# Patient Record
Sex: MALE | Race: OTHER | HISPANIC OR LATINO | Employment: UNEMPLOYED | URBAN - METROPOLITAN AREA
[De-identification: names, ages, dates, MRNs, and addresses within clinical notes are randomized per-mention and may not be internally consistent; named-entity substitution may affect disease eponyms.]

---

## 2017-09-09 ENCOUNTER — HOSPITAL ENCOUNTER (EMERGENCY)
Facility: HOSPITAL | Age: 27
Discharge: HOME | End: 2017-09-09
Attending: EMERGENCY MEDICINE
Payer: COMMERCIAL

## 2017-09-09 VITALS
TEMPERATURE: 97.8 F | OXYGEN SATURATION: 98 % | HEART RATE: 83 BPM | WEIGHT: 181.44 LBS | DIASTOLIC BLOOD PRESSURE: 91 MMHG | SYSTOLIC BLOOD PRESSURE: 144 MMHG | RESPIRATION RATE: 16 BRPM

## 2017-09-09 DIAGNOSIS — H60.02 ABSCESS OF LEFT EXTERNAL EAR: Primary | ICD-10-CM

## 2017-09-09 PROCEDURE — 99282 EMERGENCY DEPT VISIT SF MDM: CPT

## 2017-09-09 RX ORDER — AMOXICILLIN AND CLAVULANATE POTASSIUM 875; 125 MG/1; MG/1
1 TABLET, FILM COATED ORAL 2 TIMES DAILY
Qty: 14 TABLET | Refills: 0 | Status: ON HOLD | OUTPATIENT
Start: 2017-09-09 | End: 2017-09-16

## 2017-09-09 RX ORDER — LIDOCAINE HYDROCHLORIDE 10 MG/ML
1 INJECTION, SOLUTION EPIDURAL; INFILTRATION; INTRACAUDAL; PERINEURAL ONCE
Status: COMPLETED | OUTPATIENT
Start: 2017-09-09 | End: 2017-09-09

## 2017-09-09 RX ORDER — AMOXICILLIN AND CLAVULANATE POTASSIUM 875; 125 MG/1; MG/1
1 TABLET, FILM COATED ORAL ONCE
Status: COMPLETED | OUTPATIENT
Start: 2017-09-09 | End: 2017-09-09

## 2017-09-09 RX ADMIN — AMOXICILLIN AND CLAVULANATE POTASSIUM 1 TABLET: 875; 125 TABLET, FILM COATED ORAL at 21:34

## 2017-09-09 RX ADMIN — LIDOCAINE HYDROCHLORIDE 1 ML: 10 INJECTION, SOLUTION EPIDURAL; INFILTRATION; INTRACAUDAL; PERINEURAL at 21:42

## 2017-09-11 ENCOUNTER — HOSPITAL ENCOUNTER (INPATIENT)
Facility: HOSPITAL | Age: 27
LOS: 4 days | Discharge: HOME | DRG: 115 | End: 2017-09-16
Attending: EMERGENCY MEDICINE | Admitting: INTERNAL MEDICINE
Payer: COMMERCIAL

## 2017-09-11 ENCOUNTER — APPOINTMENT (EMERGENCY)
Dept: CT IMAGING | Facility: HOSPITAL | Age: 27
DRG: 115 | End: 2017-09-11
Payer: COMMERCIAL

## 2017-09-11 DIAGNOSIS — H60.12 CELLULITIS OF LEFT EAR: Primary | ICD-10-CM

## 2017-09-11 DIAGNOSIS — H60.02 ABSCESS OF LEFT EXTERNAL EAR: ICD-10-CM

## 2017-09-11 LAB
ALBUMIN SERPL BCP-MCNC: 4.1 G/DL (ref 3.5–5)
ALP SERPL-CCNC: 82 U/L (ref 46–116)
ALT SERPL W P-5'-P-CCNC: 76 U/L (ref 12–78)
ANION GAP SERPL CALCULATED.3IONS-SCNC: 8 MMOL/L (ref 4–13)
APTT PPP: 29 SECONDS (ref 23–35)
AST SERPL W P-5'-P-CCNC: 37 U/L (ref 5–45)
BASOPHILS # BLD AUTO: 0.02 THOUSANDS/ΜL (ref 0–0.1)
BASOPHILS NFR BLD AUTO: 0 % (ref 0–1)
BILIRUB SERPL-MCNC: 0.4 MG/DL (ref 0.2–1)
BUN SERPL-MCNC: 17 MG/DL (ref 5–25)
CALCIUM SERPL-MCNC: 9.1 MG/DL (ref 8.3–10.1)
CHLORIDE SERPL-SCNC: 101 MMOL/L (ref 100–108)
CO2 SERPL-SCNC: 31 MMOL/L (ref 21–32)
CREAT SERPL-MCNC: 1.02 MG/DL (ref 0.6–1.3)
CRP SERPL HS-MCNC: 62.49 MG/L
EOSINOPHIL # BLD AUTO: 0.11 THOUSAND/ΜL (ref 0–0.61)
EOSINOPHIL NFR BLD AUTO: 1 % (ref 0–6)
ERYTHROCYTE [DISTWIDTH] IN BLOOD BY AUTOMATED COUNT: 12.8 % (ref 11.6–15.1)
GFR SERPL CREATININE-BSD FRML MDRD: 100 ML/MIN/1.73SQ M
GLUCOSE SERPL-MCNC: 101 MG/DL (ref 65–140)
HCT VFR BLD AUTO: 46.8 % (ref 36.5–49.3)
HGB BLD-MCNC: 15.9 G/DL (ref 12–17)
INR PPP: 0.98 (ref 0.86–1.16)
LACTATE SERPL-SCNC: 1.4 MMOL/L (ref 0.5–2)
LYMPHOCYTES # BLD AUTO: 1.78 THOUSANDS/ΜL (ref 0.6–4.47)
LYMPHOCYTES NFR BLD AUTO: 15 % (ref 14–44)
MCH RBC QN AUTO: 27.9 PG (ref 26.8–34.3)
MCHC RBC AUTO-ENTMCNC: 34 G/DL (ref 31.4–37.4)
MCV RBC AUTO: 82 FL (ref 82–98)
MONOCYTES # BLD AUTO: 1.03 THOUSAND/ΜL (ref 0.17–1.22)
MONOCYTES NFR BLD AUTO: 9 % (ref 4–12)
NEUTROPHILS # BLD AUTO: 8.92 THOUSANDS/ΜL (ref 1.85–7.62)
NEUTS SEG NFR BLD AUTO: 75 % (ref 43–75)
PLATELET # BLD AUTO: 256 THOUSANDS/UL (ref 149–390)
PMV BLD AUTO: 10.4 FL (ref 8.9–12.7)
POTASSIUM SERPL-SCNC: 3.8 MMOL/L (ref 3.5–5.3)
PROT SERPL-MCNC: 8.1 G/DL (ref 6.4–8.2)
PROTHROMBIN TIME: 13.3 SECONDS (ref 12.1–14.4)
RBC # BLD AUTO: 5.69 MILLION/UL (ref 3.88–5.62)
SODIUM SERPL-SCNC: 140 MMOL/L (ref 136–145)
WBC # BLD AUTO: 11.86 THOUSAND/UL (ref 4.31–10.16)

## 2017-09-11 PROCEDURE — 85652 RBC SED RATE AUTOMATED: CPT | Performed by: EMERGENCY MEDICINE

## 2017-09-11 PROCEDURE — 85610 PROTHROMBIN TIME: CPT | Performed by: EMERGENCY MEDICINE

## 2017-09-11 PROCEDURE — 80053 COMPREHEN METABOLIC PANEL: CPT | Performed by: EMERGENCY MEDICINE

## 2017-09-11 PROCEDURE — 86141 C-REACTIVE PROTEIN HS: CPT | Performed by: EMERGENCY MEDICINE

## 2017-09-11 PROCEDURE — 96375 TX/PRO/DX INJ NEW DRUG ADDON: CPT

## 2017-09-11 PROCEDURE — 70481 CT ORBIT/EAR/FOSSA W/DYE: CPT

## 2017-09-11 PROCEDURE — 96365 THER/PROPH/DIAG IV INF INIT: CPT

## 2017-09-11 PROCEDURE — 83605 ASSAY OF LACTIC ACID: CPT | Performed by: EMERGENCY MEDICINE

## 2017-09-11 PROCEDURE — 87040 BLOOD CULTURE FOR BACTERIA: CPT | Performed by: EMERGENCY MEDICINE

## 2017-09-11 PROCEDURE — 36415 COLL VENOUS BLD VENIPUNCTURE: CPT | Performed by: EMERGENCY MEDICINE

## 2017-09-11 PROCEDURE — 85025 COMPLETE CBC W/AUTO DIFF WBC: CPT | Performed by: EMERGENCY MEDICINE

## 2017-09-11 PROCEDURE — 96376 TX/PRO/DX INJ SAME DRUG ADON: CPT

## 2017-09-11 PROCEDURE — 85730 THROMBOPLASTIN TIME PARTIAL: CPT | Performed by: EMERGENCY MEDICINE

## 2017-09-11 PROCEDURE — 96367 TX/PROPH/DG ADDL SEQ IV INF: CPT

## 2017-09-11 RX ORDER — CLINDAMYCIN PHOSPHATE 600 MG/50ML
600 INJECTION INTRAVENOUS ONCE
Status: COMPLETED | OUTPATIENT
Start: 2017-09-11 | End: 2017-09-12

## 2017-09-11 RX ORDER — ONDANSETRON 2 MG/ML
4 INJECTION INTRAMUSCULAR; INTRAVENOUS ONCE
Status: COMPLETED | OUTPATIENT
Start: 2017-09-11 | End: 2017-09-11

## 2017-09-11 RX ADMIN — HYDROMORPHONE HYDROCHLORIDE 1 MG: 1 INJECTION, SOLUTION INTRAMUSCULAR; INTRAVENOUS; SUBCUTANEOUS at 23:46

## 2017-09-11 RX ADMIN — HYDROMORPHONE HYDROCHLORIDE 0.5 MG: 1 INJECTION, SOLUTION INTRAMUSCULAR; INTRAVENOUS; SUBCUTANEOUS at 22:48

## 2017-09-11 RX ADMIN — SODIUM CHLORIDE 1000 ML: 0.9 INJECTION, SOLUTION INTRAVENOUS at 22:49

## 2017-09-11 RX ADMIN — CLINDAMYCIN PHOSPHATE 600 MG: 12 INJECTION, SOLUTION INTRAMUSCULAR; INTRAVENOUS at 23:36

## 2017-09-11 RX ADMIN — CEFTRIAXONE SODIUM 1000 MG: 10 INJECTION, POWDER, FOR SOLUTION INTRAVENOUS at 23:03

## 2017-09-11 RX ADMIN — ONDANSETRON 4 MG: 2 INJECTION INTRAMUSCULAR; INTRAVENOUS at 22:48

## 2017-09-12 ENCOUNTER — APPOINTMENT (INPATIENT)
Dept: CT IMAGING | Facility: HOSPITAL | Age: 27
DRG: 115 | End: 2017-09-12
Payer: COMMERCIAL

## 2017-09-12 PROBLEM — H60.02 ABSCESS OF LEFT EXTERNAL EAR: Status: ACTIVE | Noted: 2017-09-12

## 2017-09-12 PROBLEM — H60.12 CELLULITIS OF LEFT EAR: Status: ACTIVE | Noted: 2017-09-12

## 2017-09-12 LAB — ERYTHROCYTE [SEDIMENTATION RATE] IN BLOOD: 6 MM/HOUR (ref 0–10)

## 2017-09-12 PROCEDURE — 99285 EMERGENCY DEPT VISIT HI MDM: CPT

## 2017-09-12 PROCEDURE — 09913ZZ DRAINAGE OF LEFT EXTERNAL EAR, PERCUTANEOUS APPROACH: ICD-10-PCS | Performed by: OTOLARYNGOLOGY

## 2017-09-12 PROCEDURE — 70470 CT HEAD/BRAIN W/O & W/DYE: CPT

## 2017-09-12 PROCEDURE — 87205 SMEAR GRAM STAIN: CPT | Performed by: OTOLARYNGOLOGY

## 2017-09-12 PROCEDURE — 87070 CULTURE OTHR SPECIMN AEROBIC: CPT | Performed by: OTOLARYNGOLOGY

## 2017-09-12 RX ORDER — METHYLPREDNISOLONE SODIUM SUCCINATE 40 MG/ML
40 INJECTION, POWDER, LYOPHILIZED, FOR SOLUTION INTRAMUSCULAR; INTRAVENOUS ONCE
Status: COMPLETED | OUTPATIENT
Start: 2017-09-12 | End: 2017-09-12

## 2017-09-12 RX ORDER — LORAZEPAM 0.5 MG/1
0.5 TABLET ORAL EVERY 6 HOURS PRN
Status: DISCONTINUED | OUTPATIENT
Start: 2017-09-12 | End: 2017-09-16 | Stop reason: HOSPADM

## 2017-09-12 RX ORDER — OXYCODONE HYDROCHLORIDE AND ACETAMINOPHEN 5; 325 MG/1; MG/1
1 TABLET ORAL EVERY 4 HOURS PRN
Status: DISCONTINUED | OUTPATIENT
Start: 2017-09-12 | End: 2017-09-16 | Stop reason: HOSPADM

## 2017-09-12 RX ORDER — ONDANSETRON 2 MG/ML
4 INJECTION INTRAMUSCULAR; INTRAVENOUS EVERY 6 HOURS PRN
Status: DISCONTINUED | OUTPATIENT
Start: 2017-09-12 | End: 2017-09-16 | Stop reason: HOSPADM

## 2017-09-12 RX ORDER — CLINDAMYCIN PHOSPHATE 600 MG/50ML
600 INJECTION INTRAVENOUS EVERY 8 HOURS
Status: DISCONTINUED | OUTPATIENT
Start: 2017-09-12 | End: 2017-09-12

## 2017-09-12 RX ORDER — ACETAMINOPHEN 325 MG/1
650 TABLET ORAL EVERY 6 HOURS PRN
Status: DISCONTINUED | OUTPATIENT
Start: 2017-09-12 | End: 2017-09-16 | Stop reason: HOSPADM

## 2017-09-12 RX ORDER — TRAMADOL HYDROCHLORIDE 50 MG/1
50 TABLET ORAL EVERY 6 HOURS PRN
Status: DISCONTINUED | OUTPATIENT
Start: 2017-09-12 | End: 2017-09-12 | Stop reason: ALTCHOICE

## 2017-09-12 RX ORDER — IBUPROFEN 400 MG/1
400 TABLET ORAL EVERY 4 HOURS PRN
Status: DISCONTINUED | OUTPATIENT
Start: 2017-09-12 | End: 2017-09-16 | Stop reason: HOSPADM

## 2017-09-12 RX ORDER — DIPHENHYDRAMINE HYDROCHLORIDE 50 MG/ML
25 INJECTION INTRAMUSCULAR; INTRAVENOUS EVERY 6 HOURS PRN
Status: DISCONTINUED | OUTPATIENT
Start: 2017-09-12 | End: 2017-09-16 | Stop reason: HOSPADM

## 2017-09-12 RX ADMIN — HYDROMORPHONE HYDROCHLORIDE 1 MG: 1 INJECTION, SOLUTION INTRAMUSCULAR; INTRAVENOUS; SUBCUTANEOUS at 02:46

## 2017-09-12 RX ADMIN — IOHEXOL 100 ML: 350 INJECTION, SOLUTION INTRAVENOUS at 22:51

## 2017-09-12 RX ADMIN — OXYCODONE HYDROCHLORIDE AND ACETAMINOPHEN 1 TABLET: 5; 325 TABLET ORAL at 07:59

## 2017-09-12 RX ADMIN — HYDROMORPHONE HYDROCHLORIDE 1 MG: 1 INJECTION, SOLUTION INTRAMUSCULAR; INTRAVENOUS; SUBCUTANEOUS at 09:52

## 2017-09-12 RX ADMIN — HYDROMORPHONE HYDROCHLORIDE 1 MG: 1 INJECTION, SOLUTION INTRAMUSCULAR; INTRAVENOUS; SUBCUTANEOUS at 06:27

## 2017-09-12 RX ADMIN — CEFAZOLIN SODIUM 1000 MG: 1 SOLUTION INTRAVENOUS at 06:37

## 2017-09-12 RX ADMIN — IBUPROFEN 400 MG: 400 TABLET ORAL at 10:33

## 2017-09-12 RX ADMIN — CEFAZOLIN SODIUM 1000 MG: 1 SOLUTION INTRAVENOUS at 13:31

## 2017-09-12 RX ADMIN — IOHEXOL 100 ML: 350 INJECTION, SOLUTION INTRAVENOUS at 00:45

## 2017-09-12 RX ADMIN — DIPHENHYDRAMINE HYDROCHLORIDE 25 MG: 50 INJECTION, SOLUTION INTRAMUSCULAR; INTRAVENOUS at 21:09

## 2017-09-12 RX ADMIN — METHYLPREDNISOLONE SODIUM SUCCINATE 40 MG: 40 INJECTION, POWDER, FOR SOLUTION INTRAMUSCULAR; INTRAVENOUS at 21:09

## 2017-09-12 RX ADMIN — CLINDAMYCIN PHOSPHATE 600 MG: 12 INJECTION, SOLUTION INTRAMUSCULAR; INTRAVENOUS at 19:18

## 2017-09-13 PROBLEM — Z72.0 TOBACCO ABUSE: Status: ACTIVE | Noted: 2017-09-13

## 2017-09-13 PROBLEM — L03.213 PERIORBITAL CELLULITIS OF LEFT EYE: Status: ACTIVE | Noted: 2017-09-13

## 2017-09-13 LAB
ANION GAP SERPL CALCULATED.3IONS-SCNC: 6 MMOL/L (ref 4–13)
BUN SERPL-MCNC: 15 MG/DL (ref 5–25)
CALCIUM SERPL-MCNC: 9.6 MG/DL (ref 8.3–10.1)
CHLORIDE SERPL-SCNC: 103 MMOL/L (ref 100–108)
CO2 SERPL-SCNC: 29 MMOL/L (ref 21–32)
CREAT SERPL-MCNC: 1.02 MG/DL (ref 0.6–1.3)
ERYTHROCYTE [DISTWIDTH] IN BLOOD BY AUTOMATED COUNT: 12.6 % (ref 11.6–15.1)
GFR SERPL CREATININE-BSD FRML MDRD: 100 ML/MIN/1.73SQ M
GLUCOSE SERPL-MCNC: 236 MG/DL (ref 65–140)
HCT VFR BLD AUTO: 44.7 % (ref 36.5–49.3)
HGB BLD-MCNC: 15.1 G/DL (ref 12–17)
MCH RBC QN AUTO: 27.9 PG (ref 26.8–34.3)
MCHC RBC AUTO-ENTMCNC: 33.8 G/DL (ref 31.4–37.4)
MCV RBC AUTO: 83 FL (ref 82–98)
PLATELET # BLD AUTO: 244 THOUSANDS/UL (ref 149–390)
PMV BLD AUTO: 10.5 FL (ref 8.9–12.7)
POTASSIUM SERPL-SCNC: 4.7 MMOL/L (ref 3.5–5.3)
RBC # BLD AUTO: 5.41 MILLION/UL (ref 3.88–5.62)
SODIUM SERPL-SCNC: 138 MMOL/L (ref 136–145)
WBC # BLD AUTO: 10.19 THOUSAND/UL (ref 4.31–10.16)

## 2017-09-13 PROCEDURE — 80048 BASIC METABOLIC PNL TOTAL CA: CPT | Performed by: PHYSICIAN ASSISTANT

## 2017-09-13 PROCEDURE — 85027 COMPLETE CBC AUTOMATED: CPT | Performed by: PHYSICIAN ASSISTANT

## 2017-09-13 RX ORDER — NICOTINE 21 MG/24HR
21 PATCH, TRANSDERMAL 24 HOURS TRANSDERMAL DAILY
Status: DISCONTINUED | OUTPATIENT
Start: 2017-09-13 | End: 2017-09-14

## 2017-09-13 RX ADMIN — IBUPROFEN 400 MG: 400 TABLET ORAL at 09:02

## 2017-09-13 RX ADMIN — VANCOMYCIN HYDROCHLORIDE 1250 MG: 1 INJECTION, POWDER, LYOPHILIZED, FOR SOLUTION INTRAVENOUS at 03:46

## 2017-09-13 RX ADMIN — VANCOMYCIN HYDROCHLORIDE 1250 MG: 1 INJECTION, POWDER, LYOPHILIZED, FOR SOLUTION INTRAVENOUS at 15:22

## 2017-09-13 RX ADMIN — NICOTINE 21 MG: 21 PATCH, EXTENDED RELEASE TRANSDERMAL at 13:35

## 2017-09-14 LAB — VANCOMYCIN TROUGH SERPL-MCNC: 6 UG/ML (ref 10–20)

## 2017-09-14 PROCEDURE — 80202 ASSAY OF VANCOMYCIN: CPT | Performed by: NURSE PRACTITIONER

## 2017-09-14 RX ADMIN — NICOTINE POLACRILEX 2 MG: 2 GUM, CHEWING ORAL at 12:56

## 2017-09-14 RX ADMIN — IBUPROFEN 400 MG: 400 TABLET ORAL at 17:34

## 2017-09-14 RX ADMIN — VANCOMYCIN HYDROCHLORIDE 1250 MG: 1 INJECTION, POWDER, LYOPHILIZED, FOR SOLUTION INTRAVENOUS at 17:29

## 2017-09-14 RX ADMIN — IBUPROFEN 400 MG: 400 TABLET ORAL at 12:14

## 2017-09-14 RX ADMIN — VANCOMYCIN HYDROCHLORIDE 1250 MG: 1 INJECTION, POWDER, LYOPHILIZED, FOR SOLUTION INTRAVENOUS at 03:12

## 2017-09-14 RX ADMIN — OXYCODONE HYDROCHLORIDE AND ACETAMINOPHEN 1 TABLET: 5; 325 TABLET ORAL at 10:54

## 2017-09-14 RX ADMIN — OXYCODONE HYDROCHLORIDE AND ACETAMINOPHEN 1 TABLET: 5; 325 TABLET ORAL at 05:59

## 2017-09-15 LAB
BACTERIA WND AEROBE CULT: NO GROWTH
GRAM STN SPEC: NORMAL
GRAM STN SPEC: NORMAL

## 2017-09-15 RX ADMIN — VANCOMYCIN HYDROCHLORIDE 1250 MG: 1 INJECTION, POWDER, LYOPHILIZED, FOR SOLUTION INTRAVENOUS at 02:06

## 2017-09-15 RX ADMIN — IBUPROFEN 400 MG: 400 TABLET ORAL at 08:36

## 2017-09-15 RX ADMIN — SODIUM CHLORIDE 3 G: 9 INJECTION, SOLUTION INTRAVENOUS at 21:41

## 2017-09-15 RX ADMIN — VANCOMYCIN HYDROCHLORIDE 1250 MG: 1 INJECTION, POWDER, LYOPHILIZED, FOR SOLUTION INTRAVENOUS at 10:05

## 2017-09-15 RX ADMIN — SODIUM CHLORIDE 3 G: 9 INJECTION, SOLUTION INTRAVENOUS at 16:50

## 2017-09-16 VITALS
WEIGHT: 179.5 LBS | OXYGEN SATURATION: 98 % | TEMPERATURE: 98.2 F | DIASTOLIC BLOOD PRESSURE: 72 MMHG | BODY MASS INDEX: 27.2 KG/M2 | RESPIRATION RATE: 18 BRPM | SYSTOLIC BLOOD PRESSURE: 128 MMHG | HEART RATE: 80 BPM | HEIGHT: 68 IN

## 2017-09-16 RX ORDER — AMOXICILLIN AND CLAVULANATE POTASSIUM 875; 125 MG/1; MG/1
1 TABLET, FILM COATED ORAL 2 TIMES DAILY
Qty: 8 TABLET | Refills: 0 | Status: SHIPPED | OUTPATIENT
Start: 2017-09-16 | End: 2017-09-20

## 2017-09-16 RX ADMIN — SODIUM CHLORIDE 3 G: 9 INJECTION, SOLUTION INTRAVENOUS at 03:25

## 2017-09-16 RX ADMIN — SODIUM CHLORIDE 3 G: 9 INJECTION, SOLUTION INTRAVENOUS at 08:19

## 2017-09-17 LAB — BACTERIA BLD CULT: NORMAL

## 2017-09-18 LAB
BACTERIA BLD CULT: NORMAL
GRAM STN SPEC: NORMAL

## 2017-09-20 ENCOUNTER — ALLSCRIPTS OFFICE VISIT (OUTPATIENT)
Dept: OTHER | Facility: OTHER | Age: 27
End: 2017-09-20

## 2018-01-10 NOTE — PROGRESS NOTES
Assessment    1  Left elbow pain (719 42) (M25 522)    Plan  Left elbow pain    · *1 - [facility] Physical Therapy  Consult  Status: Hold For - Scheduling   Requested for: 21Jan2016  Care Summary provided  : Yes    Discussion/Summary    Pt  encouraged to continue to follow-up with ortho  Pt  referred to PT for follow-up  Pt  instructed to follow-up prn  The treatment plan was reviewed with the patient/guardian  The patient/guardian understands and agrees with the treatment plan      Chief Complaint  Pt  is here for a follow-up for elbow surgery  History of Present Illness  Pt  is here for a follow-up for left elbow surgery  Pt  reports that he had the surgery about 3 weeks ago  Pt  reports that he is still having left elbow pain and it is stiff  Pt  reports that he is following up with ortho and needs a referral for PT  Pt  reports that he takes Percocet prn for the pain but it only helps alittle  Pt  reports that he doesn't like to take pain medication and would like to start PT to help with his symptoms  Review of Systems    Constitutional: no fever and no chills  Cardiovascular: no chest pain and no palpitations  Respiratory: no shortness of breath and no wheezing  Gastrointestinal: no abdominal pain, no nausea, no vomiting and no diarrhea  Musculoskeletal: as noted in HPI  Integumentary: no rashes  Neurological: no headache, no dizziness and no fainting  Active Problems    1  Aftercare following surgery of the musculoskeletal system (V58 78) (Z47 89)   2  Exposure to potentially hazardous body fluids (V15 85) (Z77 21)   3  Insomnia (780 52) (G47 00)   4  Left knee pain (719 46) (M25 562)   5  Other closed displaced fracture of distal end of left humerus, initial encounter (812 49)   (S42 492A)   6  Rupture of left anterior cruciate ligament (844 2) (S83 512A)   7  Sprain of medial collateral ligament of left knee (844 1) (S83 412A)   8   Urethritis (597 80) (N34 2)    Past Medical History    The active problems and past medical history were reviewed and updated today  Surgical History    1  History of Elbow Surgery    The surgical history was reviewed and updated today  Family History    1  No pertinent family history    The family history was reviewed and updated today  Social History    · Alcohol Use (History)   · Caffeine Use   · Current Every Day Smoker (305 1)   · Marital History - Single   · Sexually active with lack of responsibility  The social history was reviewed and updated today  Current Meds   1  Oxycodone-Acetaminophen 5-325 MG Oral Tablet; TAKE 1 TABLET every 4 hours as   needed for pain; Therapy: [id number] to (Last Rx:[id number]) Ordered    The medication list was reviewed and updated today  Allergies    1  No Known Drug Allergies    Vitals  Vital Signs [Data Includes: Current Encounter]    Recorded: 21Jan2016 09:17AM   Heart Rate 72   Respiration 14   Systolic 765   Diastolic 80   Height 5 ft 8 in   Weight 150 lb    BMI Calculated 22 81   BSA Calculated 1 81     Physical Exam    Constitutional   General appearance: No acute distress, well appearing and well nourished  Eyes   Conjunctiva and lids: No swelling, erythema, or discharge  Ears, Nose, Mouth, and Throat   External inspection of ears and nose: Normal     Pulmonary   Respiratory effort: No increased work of breathing or signs of respiratory distress  Auscultation of lungs: Clear to auscultation, equal breath sounds bilaterally, no wheezes, no rales, no rhonci  Cardiovascular   Auscultation of heart: Normal rate and rhythm, normal S1 and S2, without murmurs  radial pulses +2 bilaterally  Musculoskeletal   Gait and station: Normal     Inspection/palpation of joints, bones, and muscles: Abnormal   Left elbow is slightly swollen  No redness or warmth noted  Incision site wnl, steri- strips noted  Skin No rashes noted     Psychiatric   Orientation to person, place and time: Normal     Mood and affect: Normal          Attending Note  Collaborating Note: I agree with the Advanced Practitioner note  Future Appointments    Date/Time Provider Specialty Site   02/24/2016 11:20 AM GRECIA Moeller   Orthopedic Surgery [facility] [place]     Signatures   Electronically signed by : Lynnette Tucker; Jan 21 2016  2:20PM EST                       (Author)    Electronically signed by : Bruce Worley DO; Jan 22 2016  2:08PM EST                       (Author)

## 2018-01-12 NOTE — MISCELLANEOUS
Chief Complaint  Chief Complaint Free Text Note Form: patient was scheduled for 2 separate dates and did not show for either appointment  History of Present Illness  TCM Communication [facility]: The patient is being contacted for follow-up after hospitalization  Hospital records were reviewed  He was hospitalized at [facility]  The date of discharge: 9/16/2017  Diagnosis: EAR INFECTION  He was discharged to home  Medications reviewed and updated today  He scheduled a follow up appointment  The patient is currently asymptomatic  Counseling was provided to the patient  Communication performed and completed by [facility], LPN      Active Problems    1  Aftercare following surgery of the musculoskeletal system (V58 78) (Z47 89)   2  Contact dermatitis due to chemicals (692 4) (L25 3)   3  Contracture, elbow, left (718 42) (M24 522)   4  Exposure to potentially hazardous body fluids (V15 85) (Z77 21)   5  Insomnia (780 52) (G47 00)   6  Left elbow pain (719 42) (M25 522)   7  Left knee pain (719 46) (M25 562)   8  Other closed displaced fracture of distal end of left humerus, initial encounter (812 49)   (S42 492A)   9  Rupture of left anterior cruciate ligament (844 2) (S83 512A)   10  Sprain of medial collateral ligament of left knee (844 1) (Z30 420J)    Past Medical History    1  History of urethritis (V13 09) (Z87 448)   2  History of Penile ulcer (607 89) (N48 5)    Surgical History    1  History of Elbow Surgery    Family History  Mother    1  No pertinent family history  Unknown    2  No pertinent family history    Social History    · Alcohol Use (History)   · Caffeine Use   · Current Every Day Smoker (305 1)   · Marital History - Single   · Sexually active with lack of responsibility    Allergies    1  No Known Drug Allergies    Signatures   Electronically signed by :  Haven Zavala, ; Sep 18 2017  1:11PM EST                       (Author)    Electronically signed by : Sandra Dobbs OM; Oct  4 2017 10: 11AM EST                       (Author)    Electronically signed by : Yomi Alonzo DO; Oct  4 2017 11:01AM EST                       (Author)

## 2019-10-04 ENCOUNTER — OFFICE VISIT (OUTPATIENT)
Dept: FAMILY MEDICINE CLINIC | Facility: CLINIC | Age: 29
End: 2019-10-04
Payer: COMMERCIAL

## 2019-10-04 ENCOUNTER — TELEPHONE (OUTPATIENT)
Dept: FAMILY MEDICINE CLINIC | Facility: CLINIC | Age: 29
End: 2019-10-04

## 2019-10-04 ENCOUNTER — DOCUMENTATION (OUTPATIENT)
Dept: BEHAVIORAL/MENTAL HEALTH CLINIC | Facility: CLINIC | Age: 29
End: 2019-10-04

## 2019-10-04 ENCOUNTER — TELEPHONE (OUTPATIENT)
Dept: BEHAVIORAL/MENTAL HEALTH CLINIC | Facility: CLINIC | Age: 29
End: 2019-10-04

## 2019-10-04 VITALS
SYSTOLIC BLOOD PRESSURE: 130 MMHG | RESPIRATION RATE: 16 BRPM | BODY MASS INDEX: 25.62 KG/M2 | WEIGHT: 173 LBS | HEART RATE: 85 BPM | OXYGEN SATURATION: 98 % | DIASTOLIC BLOOD PRESSURE: 80 MMHG | HEIGHT: 69 IN

## 2019-10-04 DIAGNOSIS — Z72.0 TOBACCO ABUSE: ICD-10-CM

## 2019-10-04 DIAGNOSIS — Z28.21 INFLUENZA VACCINATION DECLINED BY PATIENT: ICD-10-CM

## 2019-10-04 DIAGNOSIS — Z76.89 ENCOUNTER TO ESTABLISH CARE: Primary | ICD-10-CM

## 2019-10-04 DIAGNOSIS — F41.9 ANXIETY: ICD-10-CM

## 2019-10-04 PROCEDURE — 99204 OFFICE O/P NEW MOD 45 MIN: CPT | Performed by: FAMILY MEDICINE

## 2019-10-04 PROCEDURE — 3008F BODY MASS INDEX DOCD: CPT | Performed by: FAMILY MEDICINE

## 2019-10-04 RX ORDER — ESCITALOPRAM OXALATE 10 MG/1
10 TABLET ORAL DAILY
Qty: 30 TABLET | Refills: 0 | Status: SHIPPED | OUTPATIENT
Start: 2019-10-04 | End: 2020-02-14

## 2019-10-04 NOTE — TELEPHONE ENCOUNTER
Tejas Echavarria was seen as new patient with Dr Farideh Clifford  She is referring him to see Matteokim Julien  He can not do Mondays  Can you call him and set up an appt on a different day

## 2019-10-04 NOTE — PROGRESS NOTES
Assessment/Plan:   Diagnoses and all orders for this visit:    Encounter to establish care  - reviewed PMHx, PSHx, meds, allergies, FHx and Soc Hx   - declined Flu vaccine in the office today     Anxiety  -     escitalopram (LEXAPRO) 10 mg tablet; Take 1 tablet (10 mg total) by mouth daily  -     Ambulatory referral to [facility]; Future  -     TSH, 3rd generation with Free T4 reflex; Future  -     Vitamin D 25 hydroxy; Future  - has been ongoing for the past 15yrs, has gotten worse in the past 5months   - PHQ-2 score of 2  - denies SI/HI   - hesitant to talk to a therapist but will take referral   - hesitant to try pharmacologics but will do a trial of Lexapro for 1month   - spent >30mins counseling pt  - RTO in 1month for f/u - pt aware and agreeable     Tobacco abuse  - former smoker, current QD vaping   - not interested in quitting at this time - will revisit at next OV in 1month - pt aware   Influenza vaccination declined by patient    Other orders  -     Cancel: TDAP VACCINE GREATER THAN OR EQUAL TO 6YO IM  -     Cancel: influenza vaccine, [id number], quadrivalent, 0 5 mL, preservative-free, for adult and pediatric patients 6 mos+ (AFLURIA, FLUARIX, FLULAVAL, FLUZONE)          Subjective:    Patient ID: Fabian Kim is a 34 y o  male    Fabian Kim is a 34 y o  male who presents to the office to establish care and for eval of anxiety  1) Establish Care   - prior PCP: hasn't been to a PCP in >3yrs   - PMHx: none   - allergies: NKDA  - Meds: none   - PSHx: fracture surgery   - FHx: F (Depression)   - Immunizations: declined Flu vaccine in the office today   - social: former smoker (quit 2018 - 1 5PPD/15yrs), (+) current vaping (3mg of Nicotine), occasional EtOH, denies illicits   2) Anxiety   - "mind is constantly racing", "its exhausting"   - the past 5months have been "really bad" and its turned into Depression  - "everything is going good" with work/family/friends   - has been going on for a while (~15yrs) - had been treated for insomnia before   - used to take Xanax for anxiety, but then would not be able to remember things/fall asleep and self d/c'd   - has been trying to deal with it by himself - but getting to a point where "its too much"   - has been on [employer] wondering if CBD can help   - (+) FHx of Depression in Father - currently not taking any medications   - PHQ-2 score of 2, denies SI/HI  - "it comes and goes"       The following portions of the patient's history were reviewed and updated as appropriate: allergies, current medications, past family history, past medical history, past social history, past surgical history and problem list     Review of Systems  as per HPI    Objective:  /80   Pulse 85   Resp 16   Ht 5' 8 5" (1 74 m)   Wt 78 5 kg (173 lb)   SpO2 98%   BMI 25 92 kg/m²    Physical Exam   Constitutional: He is oriented to person, place, and time  He appears well-developed and well-nourished  No distress  HENT:   Head: Normocephalic and atraumatic  Right Ear: External ear normal    Left Ear: External ear normal    Nose: Nose normal    Eyes: Conjunctivae and EOM are normal  Right eye exhibits no discharge  Left eye exhibits no discharge  No scleral icterus  Neck: Normal range of motion  Musculoskeletal: Normal range of motion  Neurological: He is alert and oriented to person, place, and time  Skin: Skin is warm  He is not diaphoretic  Psychiatric: His speech is normal and behavior is normal  Judgment and thought content normal  His mood appears anxious  Cognition and memory are normal  He expresses no homicidal and no suicidal ideation  He expresses no suicidal plans and no homicidal plans  PHQ-2 score of 2   Vitals reviewed  BMI Counseling: Body mass index is 25 92 kg/m²  The BMI is above normal  Nutrition recommendations include decreasing overall calorie intake  Exercise recommendations include joining a gym

## 2019-10-04 NOTE — TELEPHONE ENCOUNTER
1159-phone call to C[facility] re: scheduling an appointment  Call went to  but mailbox is full, so could not leave message

## 2019-10-04 NOTE — PROGRESS NOTES
providers      Caio Bob    Actions  To:  [email or web address]     Friday, October 04, 2019 12:06 PM        Hello, this message is for Rogers   I am the therapist at [address] [place] [address]   I was trying to contact you regarding setting up an appointment at one of my other offices, as Dr Marcelino Bryan has recommended you see me  However, my other practices are quite far from [place], and I am currently booking in November and [place] I wanted to offer you some closer referrals so you can call and try to get in with someone who might be able to see you sooner   Below are a few that are listed with your insurance company within [place]:  [address], [address] [facility], [address], [phone]     I hope that one of these is a good fit for you, and that they can get you in soon   If you need anything further from me, please call the [facility] office   Thank you!     Felipe Mccarthy, [email or web address]

## 2019-11-08 ENCOUNTER — APPOINTMENT (OUTPATIENT)
Dept: LAB | Facility: CLINIC | Age: 29
End: 2019-11-08
Payer: COMMERCIAL

## 2019-11-08 DIAGNOSIS — F41.9 ANXIETY: ICD-10-CM

## 2019-11-08 LAB
25(OH)D3 SERPL-MCNC: 15.2 NG/ML (ref 30–100)
TSH SERPL DL<=0.05 MIU/L-ACNC: 0.88 UIU/ML (ref 0.36–3.74)

## 2019-11-08 PROCEDURE — 84443 ASSAY THYROID STIM HORMONE: CPT

## 2019-11-08 PROCEDURE — 82306 VITAMIN D 25 HYDROXY: CPT

## 2019-11-08 PROCEDURE — 36415 COLL VENOUS BLD VENIPUNCTURE: CPT

## 2019-11-15 ENCOUNTER — OFFICE VISIT (OUTPATIENT)
Dept: FAMILY MEDICINE CLINIC | Facility: CLINIC | Age: 29
End: 2019-11-15
Payer: COMMERCIAL

## 2019-11-15 VITALS
DIASTOLIC BLOOD PRESSURE: 78 MMHG | OXYGEN SATURATION: 98 % | HEIGHT: 69 IN | BODY MASS INDEX: 25.62 KG/M2 | RESPIRATION RATE: 16 BRPM | HEART RATE: 69 BPM | WEIGHT: 173 LBS | SYSTOLIC BLOOD PRESSURE: 146 MMHG

## 2019-11-15 DIAGNOSIS — F41.9 ANXIETY: ICD-10-CM

## 2019-11-15 DIAGNOSIS — E55.9 VITAMIN D DEFICIENCY: ICD-10-CM

## 2019-11-15 DIAGNOSIS — Z28.21 INFLUENZA VACCINATION DECLINED BY PATIENT: ICD-10-CM

## 2019-11-15 DIAGNOSIS — F41.9 ANXIETY: Primary | ICD-10-CM

## 2019-11-15 PROCEDURE — 1036F TOBACCO NON-USER: CPT | Performed by: FAMILY MEDICINE

## 2019-11-15 PROCEDURE — 99214 OFFICE O/P EST MOD 30 MIN: CPT | Performed by: FAMILY MEDICINE

## 2019-11-15 RX ORDER — ESCITALOPRAM OXALATE 10 MG/1
TABLET ORAL
Qty: 30 TABLET | Refills: 0 | OUTPATIENT
Start: 2019-11-15

## 2019-11-15 NOTE — PROGRESS NOTES
Assessment/Plan:   Diagnoses and all orders for this visit:    Anxiety  -     Ambulatory referral to [facility]; Future  - nml TSH but with Vit D deficiency (see below)  - has been ongoing for the past 15yrs, has gotten worse in the past 6months   - PHQ-2 score of 2 at last OV on 10/4/2019   - denies SI/HI   - has referral to therapist but has been unable to schedule an appt with Felipe Mccarthy, referred to [facility]   - did a trial of Lexapro x2wks duration and noted that it did help, but unable to tolerate the side effects - did offer trial of Wellbutrin but pt declined   - spent 20mins counseling pt  - RTO in 3month for f/u - pt aware and agreeable     Vitamin D deficiency  -     Cholecalciferol 1 25 MG (01250 UT) capsule; Take 1 capsule (50,000 Units total) by mouth once a week x12wks and then to take Vit D 2000IU QD    Influenza vaccination declined by patient    Other orders  -     Cancel: influenza vaccine, [id number], quadrivalent, 0 5 mL, preservative-free, for adult and pediatric patients 6 mos+ (AFLURIA, FLUARIX, FLULAVAL, FLUZONE)        Subjective:    Patient ID: Latrice Lucero is a 34 y o  male    34yo M presents to the office for f/u of anxiety   - nml TSH and Vit D 15 2  - did try Lexapro 10mg QD x2wks duration but "felt sick to my stomach" and self d/c'd  - has not yet been able to schedule with Pato Vickers/Therapist   - (+) "mind is constantly racing", "its exhausting" - worries about his kids (10yo son and daughter), doesn't want them to have the same upbringing as he did, not in a relationship with their respective mothers   - "everything is going good" with work/family/friends   - has been going on for a while (~15yrs) - had been treated for insomnia before   - used to take Xanax for anxiety, but then would not be able to remember things/fall asleep and self d/c'd   - has been trying to deal with it by himself - but getting to a point where "its too much"   - (+) FHx of Depression in Father - currently not taking any medications   - PHQ-2 score of 2 (at last OV 5wks prior), denies SI/HI   - "it comes and goes"     The following portions of the patient's history were reviewed and updated as appropriate: allergies, current medications, past family history, past medical history, past social history, past surgical history and problem list     Review of Systems  as per HPI    Objective:  /78   Pulse 69   Resp 16   Ht 5' 8 5" (1 74 m)   Wt 78 5 kg (173 lb)   SpO2 98%   BMI 25 92 kg/m²    Physical Exam   Constitutional: He is oriented to person, place, and time  He appears well-developed and well-nourished  No distress  HENT:   Head: Normocephalic and atraumatic  Right Ear: External ear normal    Left Ear: External ear normal    Nose: Nose normal    Eyes: Conjunctivae and EOM are normal  Right eye exhibits no discharge  Left eye exhibits no discharge  No scleral icterus  Neck: Normal range of motion  Neck supple  Musculoskeletal: Normal range of motion  Neurological: He is alert and oriented to person, place, and time  Skin: He is not diaphoretic  Psychiatric: He has a normal mood and affect  His speech is normal and behavior is normal  Judgment and thought content normal  Cognition and memory are normal  He expresses no homicidal and no suicidal ideation  He expresses no suicidal plans and no homicidal plans  Vitals reviewed  I have spent 20 minutes with Patient  today in which greater than 50% of this time was spent in counseling/coordination of care regarding Prognosis, Risks and benefits of tx options, Intructions for management and Patient and family education  Tobacco Cessation Counseling: Tobacco cessation counseling was provided  The patient is sincerely urged to quit consumption of tobacco  He is not ready to quit tobacco  Medication options and side effects of medication discussed  Patient refused medication

## 2020-02-14 ENCOUNTER — OFFICE VISIT (OUTPATIENT)
Dept: FAMILY MEDICINE CLINIC | Facility: CLINIC | Age: 30
End: 2020-02-14
Payer: COMMERCIAL

## 2020-02-14 ENCOUNTER — APPOINTMENT (OUTPATIENT)
Dept: LAB | Facility: CLINIC | Age: 30
End: 2020-02-14
Payer: COMMERCIAL

## 2020-02-14 VITALS
OXYGEN SATURATION: 98 % | RESPIRATION RATE: 16 BRPM | WEIGHT: 174 LBS | BODY MASS INDEX: 25.77 KG/M2 | HEART RATE: 73 BPM | HEIGHT: 69 IN | SYSTOLIC BLOOD PRESSURE: 130 MMHG | DIASTOLIC BLOOD PRESSURE: 80 MMHG

## 2020-02-14 DIAGNOSIS — Z28.21 INFLUENZA VACCINATION DECLINED BY PATIENT: ICD-10-CM

## 2020-02-14 DIAGNOSIS — R00.2 INTERMITTENT PALPITATIONS: ICD-10-CM

## 2020-02-14 DIAGNOSIS — Z11.3 SCREENING EXAMINATION FOR STD (SEXUALLY TRANSMITTED DISEASE): ICD-10-CM

## 2020-02-14 DIAGNOSIS — Z13.0 SCREENING FOR DEFICIENCY ANEMIA: ICD-10-CM

## 2020-02-14 DIAGNOSIS — Z13.1 SCREENING FOR DIABETES MELLITUS: ICD-10-CM

## 2020-02-14 DIAGNOSIS — E55.9 VITAMIN D DEFICIENCY: ICD-10-CM

## 2020-02-14 DIAGNOSIS — Z72.0 TOBACCO ABUSE: ICD-10-CM

## 2020-02-14 DIAGNOSIS — F41.9 ANXIETY: ICD-10-CM

## 2020-02-14 DIAGNOSIS — Z00.00 ANNUAL PHYSICAL EXAM: Primary | ICD-10-CM

## 2020-02-14 LAB
ALBUMIN SERPL BCP-MCNC: 4.4 G/DL (ref 3.5–5)
ALP SERPL-CCNC: 48 U/L (ref 46–116)
ALT SERPL W P-5'-P-CCNC: 27 U/L (ref 12–78)
ANION GAP SERPL CALCULATED.3IONS-SCNC: 2 MMOL/L (ref 4–13)
AST SERPL W P-5'-P-CCNC: 13 U/L (ref 5–45)
BASOPHILS # BLD AUTO: 0.03 THOUSANDS/ΜL (ref 0–0.1)
BASOPHILS NFR BLD AUTO: 1 % (ref 0–1)
BILIRUB SERPL-MCNC: 0.86 MG/DL (ref 0.2–1)
BUN SERPL-MCNC: 19 MG/DL (ref 5–25)
CALCIUM SERPL-MCNC: 9.1 MG/DL (ref 8.3–10.1)
CHLORIDE SERPL-SCNC: 105 MMOL/L (ref 100–108)
CO2 SERPL-SCNC: 30 MMOL/L (ref 21–32)
CREAT SERPL-MCNC: 1.02 MG/DL (ref 0.6–1.3)
EOSINOPHIL # BLD AUTO: 0.04 THOUSAND/ΜL (ref 0–0.61)
EOSINOPHIL NFR BLD AUTO: 1 % (ref 0–6)
ERYTHROCYTE [DISTWIDTH] IN BLOOD BY AUTOMATED COUNT: 11.9 % (ref 11.6–15.1)
GFR SERPL CREATININE-BSD FRML MDRD: 99 ML/MIN/1.73SQ M
GLUCOSE SERPL-MCNC: 102 MG/DL (ref 65–140)
HAV IGM SER QL: NORMAL
HBV CORE IGM SER QL: NORMAL
HBV SURFACE AG SER QL: NORMAL
HCT VFR BLD AUTO: 49 % (ref 36.5–49.3)
HCV AB SER QL: NORMAL
HGB BLD-MCNC: 16.4 G/DL (ref 12–17)
IMM GRANULOCYTES # BLD AUTO: 0.01 THOUSAND/UL (ref 0–0.2)
IMM GRANULOCYTES NFR BLD AUTO: 0 % (ref 0–2)
LYMPHOCYTES # BLD AUTO: 2.25 THOUSANDS/ΜL (ref 0.6–4.47)
LYMPHOCYTES NFR BLD AUTO: 37 % (ref 14–44)
MCH RBC QN AUTO: 27.9 PG (ref 26.8–34.3)
MCHC RBC AUTO-ENTMCNC: 33.5 G/DL (ref 31.4–37.4)
MCV RBC AUTO: 84 FL (ref 82–98)
MONOCYTES # BLD AUTO: 0.47 THOUSAND/ΜL (ref 0.17–1.22)
MONOCYTES NFR BLD AUTO: 8 % (ref 4–12)
NEUTROPHILS # BLD AUTO: 3.26 THOUSANDS/ΜL (ref 1.85–7.62)
NEUTS SEG NFR BLD AUTO: 53 % (ref 43–75)
NRBC BLD AUTO-RTO: 0 /100 WBCS
PLATELET # BLD AUTO: 233 THOUSANDS/UL (ref 149–390)
PMV BLD AUTO: 11.8 FL (ref 8.9–12.7)
POTASSIUM SERPL-SCNC: 4.2 MMOL/L (ref 3.5–5.3)
PROT SERPL-MCNC: 7.7 G/DL (ref 6.4–8.2)
RBC # BLD AUTO: 5.87 MILLION/UL (ref 3.88–5.62)
SODIUM SERPL-SCNC: 137 MMOL/L (ref 136–145)
WBC # BLD AUTO: 6.06 THOUSAND/UL (ref 4.31–10.16)

## 2020-02-14 PROCEDURE — 87389 HIV-1 AG W/HIV-1&-2 AB AG IA: CPT

## 2020-02-14 PROCEDURE — 80074 ACUTE HEPATITIS PANEL: CPT

## 2020-02-14 PROCEDURE — 99395 PREV VISIT EST AGE 18-39: CPT | Performed by: FAMILY MEDICINE

## 2020-02-14 PROCEDURE — 87491 CHLMYD TRACH DNA AMP PROBE: CPT

## 2020-02-14 PROCEDURE — 85025 COMPLETE CBC W/AUTO DIFF WBC: CPT

## 2020-02-14 PROCEDURE — 86592 SYPHILIS TEST NON-TREP QUAL: CPT

## 2020-02-14 PROCEDURE — 80053 COMPREHEN METABOLIC PANEL: CPT

## 2020-02-14 PROCEDURE — 87591 N.GONORRHOEAE DNA AMP PROB: CPT

## 2020-02-14 PROCEDURE — 36415 COLL VENOUS BLD VENIPUNCTURE: CPT

## 2020-02-14 NOTE — PATIENT INSTRUCTIONS

## 2020-02-14 NOTE — PROGRESS NOTES
Assessment/Plan:   Diagnoses and all orders for this visit:    Annual physical exam  - reviewed PMHx, PSHx, meds, allergies, FHx, Soc Hx and Sexual Hx   - last Tdap was 2016  - declined Flu vaccine in the office today   - overdue for Optho - states "I need glasses" - referred to Optometrist - UTD with dental   - nml TSH and with Vit D deficiency (see below)   - due for other routine screening labs - CBC and CMP - script given  - interested in STD screening - script given   - RTO in 1yr for annual exam or sooner if with abnml labs - pt aware and agreeable   Screening examination for STD (sexually transmitted disease)  -     RPR; Future  -     HIV 1/2 AG-AB combo; Future  -     Chlamydia/GC amplified DNA by PCR; Future  -     Hepatitis panel, acute; Future    Screening for diabetes mellitus  -     Comprehensive metabolic panel; Future    Screening for deficiency anemia  -     CBC and differential; Future    Anxiety  - nml TSH but with Vit D deficiency (see below)  - has been ongoing for the past 15yrs, had gotten worse in the past 6months but now is - "getting better" - "its there but not as bad"   - PHQ-2 score of 2 at OV on 10/4/2019   - denies SI/HI   - did a trial of Lexapro x2wks duration and noted that it did help, but unable to tolerate the side effects - did offer trial of Wellbutrin at last OV but pt declined   - has referral to therapist but has been unable to schedule an appt with Monalisa Orellana, referred to [facility] and advised to establish with a therapist - pt aware and agreeable  Intermittent palpitations  - denies caffeine  - nml TSH (11/2019)  - nml HR and VS in the office today   - believe linked to pt's PMHx of anxiety    Vitamin D deficiency  - has 2 more doses of Vit D 50,000IU Qwk and then will switch to OTC supplements     Tobacco abuse  - former smoker but currently vapes and not ready to quit - will revisit at next OV   Influenza vaccination declined by patient        Subjective: Patient ID: Brian Enrique is a 34 y o  male  Brian Enrique is a 34 y o  male who presents to the office with his daughter for an annual exam and f/u of anxiety   1) Annual Exam   - PMHx: Vit D deficiency, Anxiety, overweight (BMI 26), current vaper    - allergies: NKDA  - Meds: Vit D supplements    - PSHx: fracture surgery of L-elbow (2016)   - FHx:  F (Depression), MGF (CAD)  - Immunizations: Tdap (2016), declined Flu vaccine in the office today   - diet/exercise: does not exercise, diet: "not too good"   - social: former smoker (quit 2018 - 1 5PPD/15yrs), (+) current vaping (3mg of Nicotine), no EtOH for the past month, denies illicits   - sexual Hx: no new partners in the past 3months, interested in STD screening   - last vision: no glasses/contacts - "I need glasses"   - last dental: 2wks ago   - ROS: today in the office pt denies F/C/N/V/HA/visual changes/CP/SOB/wheezing/abd pain/D/LE edema or calf tenderness  - (+) intermittent palpitations - come at any time, denies caffeine, nml TSH (11/2019), nml HR in the office today   2) Anxiety   - "getting better" - "its there but not as bad"   - has been going on for a while (~15yrs) - had been treated for insomnia before   - used to take Xanax for anxiety, but then would not be able to remember things/fall asleep and self d/c'd   - (+) FHx of Depression in Father - currently not taking any medications   - has not followed with Therapist     The following portions of the patient's history were reviewed and updated as appropriate: allergies, current medications, past family history, past medical history, past social history, past surgical history and problem list     Review of Systems  as per HPI    Objective:  /80 (BP Location: Left arm, Patient Position: Sitting, Cuff Size: Standard)   Pulse 73   Resp 16   Ht 5' 8 5" (1 74 m)   Wt 78 9 kg (174 lb)   SpO2 98%   BMI 26 07 kg/m²    Physical Exam   Constitutional: He is oriented to person, place, and time  He appears well-developed and well-nourished  He is active  No distress  HENT:   Head: Normocephalic and atraumatic  Right Ear: Tympanic membrane, external ear and ear canal normal  No drainage, swelling or tenderness  No middle ear effusion  Left Ear: Tympanic membrane, external ear and ear canal normal  No drainage, swelling or tenderness  No middle ear effusion  Nose: Nose normal  No mucosal edema, rhinorrhea or septal deviation  Right sinus exhibits no maxillary sinus tenderness and no frontal sinus tenderness  Left sinus exhibits no maxillary sinus tenderness and no frontal sinus tenderness  Mouth/Throat: Uvula is midline, oropharynx is clear and moist and mucous membranes are normal  No oral lesions  Normal dentition  No uvula swelling  No oropharyngeal exudate, posterior oropharyngeal edema or posterior oropharyngeal erythema  Eyes: Pupils are equal, round, and reactive to light  Conjunctivae, EOM and lids are normal  Right eye exhibits no discharge  Left eye exhibits no discharge  No scleral icterus  Neck: Trachea normal and normal range of motion  Neck supple  No tracheal deviation present  Cardiovascular: Normal rate, regular rhythm, S1 normal, S2 normal and normal heart sounds  Exam reveals no gallop and no friction rub  No murmur heard  Pulmonary/Chest: Effort normal and breath sounds normal  No accessory muscle usage or stridor  No respiratory distress  He has no wheezes  He has no rhonchi  He has no rales  Abdominal: Soft  Normal appearance and bowel sounds are normal  He exhibits no distension  Musculoskeletal: Normal range of motion  He exhibits no edema, tenderness or deformity  Neurological: He is alert and oriented to person, place, and time  He has normal strength  No sensory deficit  Coordination and gait normal    Skin: Skin is warm  No rash noted  He is not diaphoretic  Psychiatric: He has a normal mood and affect   His speech is normal and behavior is normal  Judgment and thought content normal  Cognition and memory are normal    Vitals reviewed

## 2020-02-15 LAB
C TRACH DNA SPEC QL NAA+PROBE: NEGATIVE
N GONORRHOEA DNA SPEC QL NAA+PROBE: NEGATIVE

## 2020-02-16 LAB — HIV 1+2 AB+HIV1 P24 AG SERPL QL IA: NORMAL

## 2020-02-17 LAB — RPR SER QL: NORMAL

## 2020-07-17 ENCOUNTER — OFFICE VISIT (OUTPATIENT)
Dept: FAMILY MEDICINE CLINIC | Facility: CLINIC | Age: 30
End: 2020-07-17
Payer: COMMERCIAL

## 2020-07-17 VITALS
HEART RATE: 89 BPM | SYSTOLIC BLOOD PRESSURE: 146 MMHG | RESPIRATION RATE: 16 BRPM | DIASTOLIC BLOOD PRESSURE: 90 MMHG | TEMPERATURE: 98.9 F | OXYGEN SATURATION: 98 % | WEIGHT: 168.8 LBS | BODY MASS INDEX: 25 KG/M2 | HEIGHT: 69 IN

## 2020-07-17 DIAGNOSIS — R00.2 INTERMITTENT PALPITATIONS: ICD-10-CM

## 2020-07-17 DIAGNOSIS — Z87.891 FORMER SMOKER: ICD-10-CM

## 2020-07-17 DIAGNOSIS — I10 ELEVATED BLOOD PRESSURE READING IN OFFICE WITH DIAGNOSIS OF HYPERTENSION: Primary | ICD-10-CM

## 2020-07-17 DIAGNOSIS — Z72.0 CURRENT EVERY DAY NICOTINE VAPING: ICD-10-CM

## 2020-07-17 PROCEDURE — 4004F PT TOBACCO SCREEN RCVD TLK: CPT | Performed by: FAMILY MEDICINE

## 2020-07-17 PROCEDURE — 99214 OFFICE O/P EST MOD 30 MIN: CPT | Performed by: FAMILY MEDICINE

## 2020-07-17 PROCEDURE — 3008F BODY MASS INDEX DOCD: CPT | Performed by: FAMILY MEDICINE

## 2020-07-17 RX ORDER — CHOLECALCIFEROL (VITAMIN D3) 125 MCG
2000 CAPSULE ORAL DAILY
COMMUNITY
End: 2020-10-02 | Stop reason: ALTCHOICE

## 2020-07-17 RX ORDER — HYDROCHLOROTHIAZIDE 25 MG/1
25 TABLET ORAL DAILY
Qty: 30 TABLET | Refills: 0 | Status: SHIPPED | OUTPATIENT
Start: 2020-07-17 | End: 2020-08-19

## 2020-07-17 NOTE — PROGRESS NOTES
Assessment/Plan:   Diagnoses and all orders for this visit:    Elevated blood pressure reading in office with diagnosis of hypertension  -     Microalbumin / creatinine urine ratio  -     Ambulatory referral to Cardiology; Future  -     Ambulatory referral to Nephrology; Future  -     hydrochlorothiazide (HYDRODIURIL) 25 mg tablet; Take 1 tablet (25 mg total) by mouth daily  - BP in the office 160/90 and 146/90 on my repeat   - FHx of MGF with CAD (age unknown), denies FHx of sudden cardiac death before 36   - former smoker - quit 2yrs ago (1PPD/14yrs), does currently vaping with 3mg nicotine - advised smoking cessation   - denies illicits  - (+) palpitations  - eats fast food and started running a few days ago - advised to limit Na to 2g/day, avoid NSAIDs and to limit fast food intake   - nml CMP/CBC in 2/2020   - concerns for secondary causes of HTN - will refer to Cardio and Nephro for further eval   - for now, will start on HCTZ 25mg QD and advised to RTO in 1month for f/u - pt aware and agreeable   Intermittent palpitations  Former smoker  Current every day nicotine vaping    Other orders  -     Cholecalciferol (VITAMIN D3) 50 MCG (2000 UT) TABS; Take 2,000 Units by mouth daily        Subjective:    Patient ID: Estela Guevara is a 27 y o  male  30yo M presents to the office for eval of elevated pressures  - His sister, who is an RN, checked his pressure randomly and noted that pt had elevated pressures  - BP in the office 160/90 and 146/90 on my repeat   - FHx of MGF with CAD (age unknown), denies FHx of sudden cardiac death before 36   - former smoker - quit 2yrs ago (1PPD/14yrs), does currently vaping with 3mg nicotine   - denies HA/change in vision/dizziness/lightheadedness/CP/SOB/MOTTA/abd pain/D/C/LE edema   - (+) palpitations  - diet: eats fast food  - started running a few days ago   - nml CMP/CBC in 2/2020       Hypertension   This is a recurrent problem   Associated symptoms include anxiety and palpitations  Pertinent negatives include no blurred vision, chest pain, headaches, malaise/fatigue, neck pain, orthopnea, peripheral edema, PND, shortness of breath or sweats  There are no associated agents to hypertension  Risk factors for coronary artery disease include family history  The following portions of the patient's history were reviewed and updated as appropriate: allergies, current medications, past family history, past medical history, past social history, past surgical history and problem list     Review of Systems   Constitutional: Negative for malaise/fatigue  Eyes: Negative for blurred vision  Respiratory: Negative for shortness of breath  Cardiovascular: Positive for palpitations  Negative for chest pain, orthopnea and PND  Musculoskeletal: Negative for neck pain  Neurological: Negative for headaches  as per HPI    Objective:  /90 (BP Location: Left arm, Patient Position: Sitting, Cuff Size: Standard)   Pulse 89   Temp 98 9 °F (37 2 °C)   Resp 16   Ht 5' 8 5" (1 74 m)   Wt 76 6 kg (168 lb 12 8 oz)   SpO2 98%   BMI 25 29 kg/m²    Physical Exam   Constitutional: He is oriented to person, place, and time  He appears well-developed and well-nourished  No distress  HENT:   Head: Normocephalic and atraumatic  Right Ear: External ear normal    Left Ear: External ear normal    Eyes: Conjunctivae and EOM are normal  Right eye exhibits no discharge  Left eye exhibits no discharge  No scleral icterus  Neck: Normal range of motion  Neck supple  No JVD present  Cardiovascular: Normal rate, regular rhythm and normal heart sounds  Exam reveals no gallop and no friction rub  No murmur heard  Pulmonary/Chest: Effort normal and breath sounds normal  No stridor  No respiratory distress  He has no wheezes  He has no rales  Abdominal: Soft  Bowel sounds are normal    BMI 25 3   Musculoskeletal: Normal range of motion  He exhibits no edema, tenderness or deformity  Neurological: He is alert and oriented to person, place, and time  Skin: Skin is warm  He is not diaphoretic  Facial flushing    Psychiatric: He has a normal mood and affect  His behavior is normal    Vitals reviewed  BMI Counseling: Body mass index is 25 29 kg/m²  The BMI is above normal  Nutrition recommendations include reducing portion sizes, 3-5 servings of fruits/vegetables daily, reducing fast food intake and consuming healthier snacks  Exercise recommendations include moderate aerobic physical activity for 150 minutes/week, exercising 3-5 times per week and joining a gym

## 2020-07-31 ENCOUNTER — TELEPHONE (OUTPATIENT)
Dept: CARDIOLOGY CLINIC | Facility: CLINIC | Age: 30
End: 2020-07-31

## 2020-08-05 ENCOUNTER — OFFICE VISIT (OUTPATIENT)
Dept: FAMILY MEDICINE CLINIC | Facility: CLINIC | Age: 30
End: 2020-08-05
Payer: COMMERCIAL

## 2020-08-05 VITALS
RESPIRATION RATE: 16 BRPM | DIASTOLIC BLOOD PRESSURE: 84 MMHG | HEART RATE: 78 BPM | BODY MASS INDEX: 24.88 KG/M2 | OXYGEN SATURATION: 98 % | SYSTOLIC BLOOD PRESSURE: 138 MMHG | WEIGHT: 168 LBS | HEIGHT: 69 IN | TEMPERATURE: 98.4 F

## 2020-08-05 DIAGNOSIS — I10 ELEVATED BLOOD PRESSURE READING IN OFFICE WITH DIAGNOSIS OF HYPERTENSION: Primary | ICD-10-CM

## 2020-08-05 DIAGNOSIS — R00.2 INTERMITTENT PALPITATIONS: ICD-10-CM

## 2020-08-05 DIAGNOSIS — Z87.891 FORMER SMOKER: ICD-10-CM

## 2020-08-05 DIAGNOSIS — Z72.0 CURRENT EVERY DAY NICOTINE VAPING: ICD-10-CM

## 2020-08-05 PROCEDURE — 4004F PT TOBACCO SCREEN RCVD TLK: CPT | Performed by: FAMILY MEDICINE

## 2020-08-05 PROCEDURE — 99213 OFFICE O/P EST LOW 20 MIN: CPT | Performed by: FAMILY MEDICINE

## 2020-08-05 PROCEDURE — 3008F BODY MASS INDEX DOCD: CPT | Performed by: FAMILY MEDICINE

## 2020-08-05 NOTE — PROGRESS NOTES
Assessment/Plan:   Diagnoses and all orders for this visit:    Elevated blood pressure reading in office with diagnosis of hypertension  - BP in the office 158/94 and on my repeat 138/84   - FHx of MGF with CAD (age unknown), denies FHx of sudden cardiac death before 36   - former smoker - quit 2yrs ago (1PPD/14yrs), does currently vaping with 3mg nicotine - advised smoking cessation   - denies illicits  - (+) intermittent palpitations and chest tightness   - has decreased his fast food intake to 2x/week vs QD   - advised to limit Na to 2g/day and avoid NSAIDs    - nml CMP/CBC in 2/2020   - urine microalbumin pending   - concerns for secondary causes of HTN - Pheo? Renal Artery Stenosis?   - has a Cardio appt on 8/7/2020  - has a Nephro appt on 8/12/2020   - for now, cont HCTZ 25mg QD and RTO in 1month for f/u - pt aware and agreeable   Intermittent palpitations  Former smoker  Current every day nicotine vaping        Subjective:    Patient ID: Jose Alcantar is a 27 y o  male    32yo M presents to the office for eval of elevated pressures  - was started on HCTZ 25mg QD at last OV - tolerating it well   - BP in the office 158/94 and on my repeat 138/84   - his sister, who is an RN - checked his pressures last week and noted they were still elevated  - FHx of MGF with CAD (age unknown), denies FHx of sudden cardiac death before 36   - former smoker - quit 2yrs ago (1PPD/14yrs), does currently vaping with 3mg nicotine   - had a HA this past weekend, none today   - denies change in vision/dizziness/lightheadedness/CP/SOB/MOTTA/abd pain/D/C/LE edema   - (+) intermittent palpitations and chest tightness (very sporadic - not a/w SOB/diaphoresis)   - diet: eats fast food but has cut down on intake (used to eat QD now 2x/week)   - has been walking for exercise   - nml CMP/CBC in 2/2020   - has an appt with Cardio (8/7/2020) and Nephro (8/12/2020) scheduled for later this month       The following portions of the patient's history were reviewed and updated as appropriate: allergies, current medications, past family history, past medical history, past social history, past surgical history and problem list     Review of Systems  as per HPI    Objective:  /84 (BP Location: Left arm, Patient Position: Sitting, Cuff Size: Standard)   Pulse 78   Temp 98 4 °F (36 9 °C) (Tympanic)   Resp 16   Ht 5' 8 5" (1 74 m)   Wt 76 2 kg (168 lb)   SpO2 98%   BMI 25 17 kg/m²    Physical Exam   Constitutional: He is oriented to person, place, and time  Non-toxic appearance  He does not appear ill  No distress  HENT:   Head: Normocephalic and atraumatic  Right Ear: External ear normal    Left Ear: External ear normal    Eyes: Conjunctivae are normal  Right eye exhibits no discharge  Left eye exhibits no discharge  No scleral icterus  Neck: Normal range of motion  Cardiovascular: Normal rate, regular rhythm and normal heart sounds  Exam reveals no gallop and no friction rub  No murmur heard  Pulmonary/Chest: Effort normal and breath sounds normal  No stridor  No respiratory distress  He has no wheezes  He has no rhonchi  He has no rales  Abdominal: Soft  Normal appearance and bowel sounds are normal    Musculoskeletal: Normal range of motion  General: No swelling, tenderness, deformity or signs of injury  Right lower leg: No edema  Left lower leg: No edema  Neurological: He is alert and oriented to person, place, and time  Skin: Skin is warm  He is not diaphoretic  Longer pinky nails b/l - pt denies Cocaine use/abuse    Psychiatric: His behavior is normal  Mood, judgment and thought content normal    Vitals reviewed

## 2020-08-05 NOTE — PATIENT INSTRUCTIONS
Heart Healthy Diet   WHAT YOU NEED TO KNOW:   What is a heart healthy diet? A heart healthy diet is an eating plan low in total fat, unhealthy fats, and sodium (salt)  A heart healthy diet helps decrease your risk for heart disease and stroke  Limit the amount of fat you eat to 25% to 35% of your total daily calories  Limit sodium to less than 2,300 mg each day  What is healthy fat, and where is it found? Healthy fats can help improve cholesterol levels  The risk for heart disease is decreased when cholesterol levels are normal  Choose healthy fats, such as the following:  · Unsaturated fat  is found in foods such as soybean, canola, olive, corn, and safflower oils  It is also found in soft tub margarine that is made with liquid vegetable oil  · Omega-3 fat  is found in certain fish, such as salmon, tuna, and trout, and in walnuts and flaxseed  What is unhealthy fat, and where is it found? Unhealthy fats can cause unhealthy cholesterol levels in your blood and increase your risk of heart disease  Limit unhealthy fats, such as the following:  · Cholesterol  is found in animal foods, such as eggs and lobster, and in dairy products made from whole milk  Limit cholesterol to less than 300 milligrams (mg) each day  You may need to limit cholesterol to 200 mg each day if you have heart disease  · Saturated fat  is found in meats, such as tena and hamburger  It is also found in chicken or turkey skin, whole milk, and butter  Limit saturated fat to less than 7% of your total daily calories  Limit saturated fat to less than 6% if you have heart disease or are at increased risk for it  · Trans fat  is found in packaged foods, such as potato chips and cookies  It is also in hard margarine, some fried foods, and shortening  Avoid trans fats as much as possible  What can I eat and drink on a heart healthy diet?   Ask your dietitian or healthcare provider how many servings to have from each of the following food groups:  · Grains:      ¨ Whole-wheat breads, cereals, and pastas, and brown rice    ¨ Low-fat, low-sodium crackers and chips    · Vegetables:      ¨ Broccoli, green beans, green peas, and spinach    ¨ Collards, kale, and lima beans    ¨ Carrots, sweet potatoes, tomatoes, and peppers    ¨ Canned vegetables with no salt added    · Fruits:      ¨ Bananas, peaches, pears, and pineapple    ¨ Grapes, raisins, and dates    ¨ Oranges, tangerines, grapefruit, orange juice, and grapefruit juice    ¨ Apricots, mangoes, melons, and papaya    ¨ Raspberries and strawberries    ¨ Canned fruit with no added sugar    · Low-fat dairy products:      ¨ Nonfat (skim) milk, 1% milk, and low-fat almond, cashew, or soy milks fortified with calcium    ¨ Low-fat cheese, regular or frozen yogurt, and cottage cheese    · Meats and proteins , such as lean cuts of beef and pork (loin, leg, round), skinless chicken and turkey, legumes, soy products, egg whites, and nuts  Which foods and drinks do I need to limit or avoid?   Ask your dietitian or healthcare provider about these and other foods that are high in unhealthy fat, sodium, and sugar:  · Snack or packaged foods , such as frozen dinners, cookies, macaroni and cheese, and cereals with more than 300 mg of sodium per serving    · Canned or dry mixes  for cakes, soups, sauces, or gravies    · Vegetables with added sodium , such as instant potatoes, vegetables with added sauces, or regular canned vegetables    · Other foods high in sodium , such as ketchup, barbecue sauce, salad dressing, pickles, olives, soy sauce, and miso    · High-fat dairy foods  such as whole or 2% milk, cream cheese, or sour cream, and cheeses     · High-fat protein foods  such as high-fat cuts of beef (T-bone steaks, ribs), chicken or turkey with skin, and organ meats, such as liver    · Cured or smoked meats , such as hot dogs, tena, and sausage    · Unhealthy fats and oils , such as butter, stick margarine, shortening, and cooking oils such as coconut or palm oil    · Food and drinks high in sugar , such as soft drinks (soda), sports drinks, sweetened tea, candy, cake, cookies, pies, and doughnuts  What other diet guidelines should I follow? · Eat more foods containing omega-3 fats  Eat fish high in omega-3 fats at least 2 times a week  · Limit alcohol  Too much alcohol can damage your heart and raise your blood pressure  Women should limit alcohol to 1 drink a day  Men should limit alcohol to 2 drinks a day  A drink of alcohol is 12 ounces of beer, 5 ounces of wine, or 1½ ounces of liquor  · Choose low-sodium foods  High-sodium foods can lead to high blood pressure  Add little or no salt to food you prepare  Use herbs and spices in place of salt  · Eat more fiber  to help lower cholesterol levels  Eat at least 5 servings of fruits and vegetables each day  Eat 3 ounces of whole-grain foods each day  Legumes (beans) are also a good source of fiber  What lifestyle guidelines should I follow? · Do not smoke  Nicotine and other chemicals in cigarettes and cigars can cause lung and heart damage  Ask your healthcare provider for information if you currently smoke and need help to quit  E-cigarettes or smokeless tobacco still contain nicotine  Talk to your healthcare provider before you use these products  · Exercise regularly  to help you maintain a healthy weight and improve your blood pressure and cholesterol levels  Ask your healthcare provider about the best exercise plan for you  Do not start an exercise program without asking your healthcare provider  CARE AGREEMENT:   You have the right to help plan your care  Discuss treatment options with your caregivers to decide what care you want to receive  You always have the right to refuse treatment  The above information is an  only  It is not intended as medical advice for individual conditions or treatments   Talk to your doctor, nurse or pharmacist before following any medical regimen to see if it is safe and effective for you  © 2017 [employer] Information is for End User's use only and may not be sold, redistributed or otherwise used for commercial purposes  All illustrations and images included in CareNotes® are the copyrighted property of A [employer]  or Jose C Villalba

## 2020-08-07 ENCOUNTER — CONSULT (OUTPATIENT)
Dept: CARDIOLOGY CLINIC | Facility: CLINIC | Age: 30
End: 2020-08-07
Payer: COMMERCIAL

## 2020-08-07 VITALS
OXYGEN SATURATION: 97 % | HEIGHT: 68 IN | DIASTOLIC BLOOD PRESSURE: 90 MMHG | SYSTOLIC BLOOD PRESSURE: 132 MMHG | HEART RATE: 64 BPM | WEIGHT: 168.6 LBS | BODY MASS INDEX: 25.55 KG/M2

## 2020-08-07 DIAGNOSIS — I10 ELEVATED BLOOD PRESSURE READING IN OFFICE WITH DIAGNOSIS OF HYPERTENSION: ICD-10-CM

## 2020-08-07 DIAGNOSIS — R06.02 SOB (SHORTNESS OF BREATH): ICD-10-CM

## 2020-08-07 DIAGNOSIS — R00.2 INTERMITTENT PALPITATIONS: Primary | ICD-10-CM

## 2020-08-07 PROCEDURE — 93000 ELECTROCARDIOGRAM COMPLETE: CPT | Performed by: INTERNAL MEDICINE

## 2020-08-07 PROCEDURE — 3075F SYST BP GE 130 - 139MM HG: CPT | Performed by: INTERNAL MEDICINE

## 2020-08-07 PROCEDURE — 99244 OFF/OP CNSLTJ NEW/EST MOD 40: CPT | Performed by: INTERNAL MEDICINE

## 2020-08-07 PROCEDURE — 3080F DIAST BP >= 90 MM HG: CPT | Performed by: INTERNAL MEDICINE

## 2020-08-07 PROCEDURE — 3008F BODY MASS INDEX DOCD: CPT | Performed by: INTERNAL MEDICINE

## 2020-08-07 NOTE — PATIENT INSTRUCTIONS

## 2020-08-07 NOTE — PROGRESS NOTES
Cardiology Consultation     Baldo Peralta  [id number]  1990  [address] [facility] [place] [address] [facility] [address]    1  Intermittent palpitations     2  Elevated blood pressure reading in office with diagnosis of hypertension  Ambulatory referral to Cardiology    POCT ECG   3  SOB (shortness of breath)       Chief Complaint   Patient presents with    Advice Only      HTN No cardiac complaints    Palpitations    Shortness of Breath     HPI: Patient is here for evaluation and management of HTN  Has some occasional palpitations and shortness of breath  No reported chest pain,  lightheadedness, syncope, LE edema, orthopnea, PND, or significant weight changes  Patient remains active without any increased fatigue out of the ordinary        Patient Active Problem List   Diagnosis    Cellulitis of left ear    Abscess of left external ear    Periorbital cellulitis of left eye    Tobacco abuse    Insomnia    Rupture of left anterior cruciate ligament    Sprain of medial collateral ligament of left knee    Contracture, elbow, left    Closed fracture of left distal humerus    Anxiety    Vitamin D deficiency    Intermittent palpitations    Elevated blood pressure reading in office with diagnosis of hypertension    SOB (shortness of breath)     Past Medical History:   Diagnosis Date    Anxiety     HTN (hypertension)     Insomnia      Social History     Socioeconomic History    Marital status: Single     Spouse name: Not on file    Number of children: Not on file    Years of education: Not on file    Highest education level: Not on file   Occupational History    Not on file   Social Needs    Financial resource strain: Not on file    Food insecurity     Worry: Not on file     Inability: Not on file    Transportation needs     Medical: Not on file     Non-medical: Not on file   Tobacco Use    Smoking status: Former Smoker     Packs/day: 1 50     Years: 15 00     Pack years: 22 50     Types: Cigarettes     Last attempt to quit: 2018     Years since quittin 3    Smokeless tobacco: Current User    Tobacco comment: vaping 3mg nicotine QD    Substance and Sexual Activity    Alcohol use: Yes     Frequency: 2-4 times a month    Drug use: No    Sexual activity: Not on file   Lifestyle    Physical activity     Days per week: Not on file     Minutes per session: Not on file    Stress: Not on file   Relationships    Social connections     Talks on phone: Not on file     Gets together: Not on file     Attends [employer] service: Not on file     Active member of club or organization: Not on file     Attends meetings of clubs or organizations: Not on file     Relationship status: Not on file    Intimate partner violence     Fear of current or ex partner: Not on file     Emotionally abused: Not on file     Physically abused: Not on file     Forced sexual activity: Not on file   Other Topics Concern    Not on file   Social History Narrative    Not on file      Family History   Problem Relation Age of Onset    No Known Problems Mother     Depression Father     No Known Problems Maternal Grandmother     Coronary artery disease Maternal Grandfather    GF: PPM    Past Surgical History:   Procedure Laterality Date    FRACTURE SURGERY      left elbow       Current Outpatient Medications:     Cholecalciferol (VITAMIN D3) 50 MCG (2000 UT) TABS, Take 2,000 Units by mouth daily, Disp: , Rfl:     hydrochlorothiazide (HYDRODIURIL) 25 mg tablet, Take 1 tablet (25 mg total) by mouth daily, Disp: 30 tablet, Rfl: 0  No Known Allergies  Vitals:    20 1404   BP: 132/90   BP Location: Left arm   Patient Position: Sitting   Cuff Size: Large   Pulse: 64   SpO2: 97%   Weight: 76 5 kg (168 lb 9 6 oz)   Height: 5' 8" (1 727 m)       Labs:  Appointment on 2020   Component Date Value    RPR 2020 Non-Reactive  HIV-1/HIV-2 Ab 02/14/2020 Non-Reactive     N gonorrhoeae, DNA Probe 02/14/2020 Negative     Chlamydia trachomatis, D* 02/14/2020 Negative     Hepatitis B Surface Ag 02/14/2020 Non-reactive     Hep A IgM 02/14/2020 Non-reactive     Hepatitis C Ab 02/14/2020 Non-reactive     Hep B C IgM 02/14/2020 Non-reactive     WBC 02/14/2020 6 06     RBC 02/14/2020 5 87*    Hemoglobin 02/14/2020 16 4     Hematocrit 02/14/2020 49 0     MCV 02/14/2020 84     MCH 02/14/2020 27 9     MCHC 02/14/2020 33 5     RDW 02/14/2020 11 9     MPV 02/14/2020 11 8     Platelets 12/35/6383 233     nRBC 02/14/2020 0     Neutrophils Relative 02/14/2020 53     Immat GRANS % 02/14/2020 0     Lymphocytes Relative 02/14/2020 37     Monocytes Relative 02/14/2020 8     Eosinophils Relative 02/14/2020 1     Basophils Relative 02/14/2020 1     Neutrophils Absolute 02/14/2020 3 26     Immature Grans Absolute 02/14/2020 0 01     Lymphocytes Absolute 02/14/2020 2 25     Monocytes Absolute 02/14/2020 0 47     Eosinophils Absolute 02/14/2020 0 04     Basophils Absolute 02/14/2020 0 03     Sodium 02/14/2020 137     Potassium 02/14/2020 4 2     Chloride 02/14/2020 105     CO2 02/14/2020 30     ANION GAP 02/14/2020 2*    BUN 02/14/2020 19     Creatinine 02/14/2020 1 02     Glucose 02/14/2020 102     Calcium 02/14/2020 9 1     AST 02/14/2020 13     ALT 02/14/2020 27     Alkaline Phosphatase 02/14/2020 48     Total Protein 02/14/2020 7 7     Albumin 02/14/2020 4 4     Total Bilirubin 02/14/2020 0 86     eGFR 02/14/2020 99      No results found for: CHOL, TRIG, HDL, LDLDIRECT  Imaging: No results found  Review of Systems:  Review of Systems   Constitutional: Negative for activity change, appetite change, fatigue and fever  HENT: Negative for nosebleeds and sore throat  Eyes: Negative for photophobia and visual disturbance  Respiratory: Negative for cough, chest tightness, shortness of breath and wheezing  Cardiovascular: Negative for chest pain, palpitations and leg swelling  Gastrointestinal: Negative for abdominal pain, diarrhea, nausea and vomiting  Endocrine: Negative for polyuria  Genitourinary: Negative for dysuria, frequency and hematuria  Musculoskeletal: Negative for arthralgias, back pain and gait problem  Skin: Negative for pallor and rash  Neurological: Negative for dizziness, syncope, speech difficulty and light-headedness  Hematological: Does not bruise/bleed easily  Psychiatric/Behavioral: Negative for agitation, behavioral problems and confusion  Physical Exam:  Physical Exam  Vitals signs reviewed  Constitutional:       Appearance: He is well-developed  He is not diaphoretic  HENT:      Head: Normocephalic and atraumatic  Nose: Nose normal    Eyes:      General: No scleral icterus  Pupils: Pupils are equal, round, and reactive to light  Neck:      Musculoskeletal: Normal range of motion and neck supple  Vascular: No JVD  Cardiovascular:      Rate and Rhythm: Normal rate and regular rhythm  Heart sounds: Normal heart sounds  No murmur  No friction rub  No gallop  Pulmonary:      Effort: Pulmonary effort is normal  No respiratory distress  Breath sounds: Normal breath sounds  No wheezing or rales  Abdominal:      General: Bowel sounds are normal  There is no distension  Palpations: Abdomen is soft  Tenderness: There is no abdominal tenderness  Musculoskeletal: Normal range of motion  General: No deformity  Skin:     General: Skin is warm and dry  Findings: No rash  Neurological:      Mental Status: He is alert and oriented to person, place, and time  Cranial Nerves: No cranial nerve deficit  Psychiatric:         Behavior: Behavior normal        Blood pressure 132/90, pulse 64, height 5' 8" (1 727 m), weight 76 5 kg (168 lb 9 6 oz), SpO2 97 %      EKG:  Normal sinus rhythm  Normal ECG    Discussion/Summary:  HTN: mildly elevated today, continued on HCTZ, which has been on board for a week so far  He just started running about 2 weeks ago  Normal baseline ECG  Has some SOB and palpitations, but unlikely to be rhythm related or ischemic  Will check an echo to assess for structural heart disease

## 2020-08-12 ENCOUNTER — CONSULT (OUTPATIENT)
Dept: NEPHROLOGY | Facility: CLINIC | Age: 30
End: 2020-08-12
Payer: COMMERCIAL

## 2020-08-12 ENCOUNTER — TELEPHONE (OUTPATIENT)
Dept: NEPHROLOGY | Facility: CLINIC | Age: 30
End: 2020-08-12

## 2020-08-12 VITALS
BODY MASS INDEX: 25.85 KG/M2 | SYSTOLIC BLOOD PRESSURE: 134 MMHG | WEIGHT: 170 LBS | TEMPERATURE: 98.4 F | DIASTOLIC BLOOD PRESSURE: 80 MMHG

## 2020-08-12 DIAGNOSIS — E55.9 VITAMIN D DEFICIENCY: ICD-10-CM

## 2020-08-12 DIAGNOSIS — I10 ELEVATED BLOOD PRESSURE READING IN OFFICE WITH DIAGNOSIS OF HYPERTENSION: Primary | ICD-10-CM

## 2020-08-12 PROCEDURE — 99244 OFF/OP CNSLTJ NEW/EST MOD 40: CPT | Performed by: INTERNAL MEDICINE

## 2020-08-12 PROCEDURE — 1036F TOBACCO NON-USER: CPT | Performed by: INTERNAL MEDICINE

## 2020-08-12 NOTE — PROGRESS NOTES
Nica [age] y o  male MRN: [id number]  Date: 8/12/2020  Reason for consultation:   Chief Complaint   Patient presents with    Consult    Hypertension     ASSESSMENT AND PLAN:  Hypertension  -he was recently diagnosed with hypertension last month and since then was started on HCTZ 25 mg daily  -will do secondary workup including serum aldosterone, PRA, plasma metanephrine/catecholamine   -no obvious history of hypokalemia based on my lab review  -he does have underlying anxiety issues which could be? Contributing along with high salt diet  -admits to be eating in restaurants three to 4 times a week, also had significant junk food three to 5 times a week  Strongly recommended to follow low salt diet  Education material provided   -patient was tried on anti anxiety medication although he could not tolerate well  Currently not on any treatment for anxiety  -he denies any obvious panic attack issues  Denies any chest pain/palpitations/shortness of breath/nausea or headache issues  -recommended again to get upper arm BP machine and continue to monitor BP on a regular basis at home and call back if remains greater than 130/80   -bring BP machine during next office visit  -may consider eventual 24 hour ABPM  -his BP slightly above goal in the office today  Will not change antihypertensive regimen for now  Recommend to follow low salt diet  Baseline creatinine stable 1 0 going back to 2017    Vitamin-D deficiency, prior vitamin-D level 15 2 in late 2019  Currently on vitamin-D 2000 units daily  Will repeat level before next visit  HISTORY OF PRESENT ILLNESS:  Savita Garcia is a 27y o  year old male with medical issues of recently diagnosed hypertension about a month ago, history of anxiety who presents for initial consultation for hypertension  Old medical records were reviewed  Patient's baseline creatinine seems to be stable around 1 0 going back to 2017  Prior creatinine 1 0 in February 2020  Patient was not following with any physician on regular basis prior although was recently found to have hypertension and could have hypertension for longer duration undetected  Was recently started on HCTZ 25 mg daily for last 2 to 3 weeks  He does not check BP at home  He does have underlying anxiety issues although denies any panic attack  He tried medications for anxiety although could not tolerate it well  He denies taking any NSAIDs on regular basis  Upon further questioning he does have significant salt intake in his diet including eating in restaurants three to 4 times a week, eating junk for three to 5 times a week  Denies any urinary complaint  Denies any obvious history of autoimmune conditions or kidney disease in the past   Denies any family history of hypertension or kidney disease  Denies any chronic pain issues  REVIEW OF SYSTEMS:    More than 10 point review of systems were obtained and discussed in length with the patient  Complete review of systems were negative / unremarkable except mentioned in the HPI section  PHYSICAL EXAM:  Vitals:    08/12/20 1538   BP: 134/80   BP Location: Left arm   Patient Position: Sitting   Cuff Size: Standard   Temp: 98 4 °F (36 9 °C)   Weight: 77 1 kg (170 lb)     Body mass index is 25 85 kg/m²  Physical Exam  Vitals signs reviewed  Constitutional:       Appearance: He is well-developed  HENT:      Head: Normocephalic and atraumatic  Right Ear: External ear normal       Left Ear: External ear normal       Nose: Nose normal    Eyes:      General: No scleral icterus  Conjunctiva/sclera: Conjunctivae normal       Pupils: Pupils are equal, round, and reactive to light  Neck:      Musculoskeletal: Neck supple  Cardiovascular:      Rate and Rhythm: Normal rate  Heart sounds: Normal heart sounds  Pulmonary:      Effort: Pulmonary effort is normal  No respiratory distress        Breath sounds: Normal breath sounds  No wheezing or rales  Abdominal:      General: Bowel sounds are normal  There is no distension  Palpations: Abdomen is soft  Tenderness: There is no abdominal tenderness  Musculoskeletal:         General: No tenderness  Right lower leg: No edema  Left lower leg: No edema  Lymphadenopathy:      Cervical: No cervical adenopathy  Skin:     General: Skin is warm and dry  Findings: No rash  Neurological:      Mental Status: He is alert and oriented to person, place, and time     Psychiatric:         Behavior: Behavior normal          PAST MEDICAL HISTORY:  Past Medical History:   Diagnosis Date    Anxiety     HTN (hypertension)     Insomnia        PAST SURGICAL HISTORY:  Past Surgical History:   Procedure Laterality Date    FRACTURE SURGERY      left elbow       ALLERGIES:  No Known Allergies    SOCIAL HISTORY:  Social History     Substance and Sexual Activity   Alcohol Use Yes    Frequency: 2-4 times a month     Social History     Substance and Sexual Activity   Drug Use No     Social History     Tobacco Use   Smoking Status Former Smoker    Packs/day: 1 50    Years: 15 00    Pack years: 22 50    Types: Cigarettes    Last attempt to quit: 2018    Years since quittin 3   Smokeless Tobacco Current User   Tobacco Comment    vaping 3mg nicotine QD        FAMILY HISTORY:  Family History   Problem Relation Age of Onset    No Known Problems Mother     Depression Father     No Known Problems Maternal Grandmother     Coronary artery disease Maternal Grandfather        MEDICATIONS:    Current Outpatient Medications:     Cholecalciferol (VITAMIN D3) 50 MCG (2000 UT) TABS, Take 2,000 Units by mouth daily, Disp: , Rfl:     hydrochlorothiazide (HYDRODIURIL) 25 mg tablet, Take 1 tablet (25 mg total) by mouth daily, Disp: 30 tablet, Rfl: 0    Lab Results:   Results for orders placed or performed in visit on 20   Chlamydia/GC amplified DNA by PCR    Specimen: Urine, Other   Result Value Ref Range    N gonorrhoeae, DNA Probe Negative Negative    Chlamydia trachomatis, DNA Probe Negative Negative   RPR   Result Value Ref Range    RPR Non-Reactive Non-Reactive   HIV 1/2 AG-AB combo   Result Value Ref Range    HIV-1/HIV-2 Ab Non-Reactive Non-Reactive   Hepatitis panel, acute   Result Value Ref Range    Hepatitis B Surface Ag Non-reactive Non-reactive, NonReactive - Confirmed    Hep A IgM Non-reactive Non-reactive, Equivocal-Suggest Recollect    Hepatitis C Ab Non-reactive Non-reactive    Hep B C IgM Non-reactive Non-reactive   CBC and differential   Result Value Ref Range    WBC 6 06 4 31 - 10 16 Thousand/uL    RBC 5 87 (H) 3 88 - 5 62 Million/uL    Hemoglobin 16 4 12 0 - 17 0 g/dL    Hematocrit 49 0 36 5 - 49 3 %    MCV 84 82 - 98 fL    MCH 27 9 26 8 - 34 3 pg    MCHC 33 5 31 4 - 37 4 g/dL    RDW 11 9 11 6 - 15 1 %    MPV 11 8 8 9 - 12 7 fL    Platelets 969 746 - 849 Thousands/uL    nRBC 0 /100 WBCs    Neutrophils Relative 53 43 - 75 %    Immat GRANS % 0 0 - 2 %    Lymphocytes Relative 37 14 - 44 %    Monocytes Relative 8 4 - 12 %    Eosinophils Relative 1 0 - 6 %    Basophils Relative 1 0 - 1 %    Neutrophils Absolute 3 26 1 85 - 7 62 Thousands/µL    Immature Grans Absolute 0 01 0 00 - 0 20 Thousand/uL    Lymphocytes Absolute 2 25 0 60 - 4 47 Thousands/µL    Monocytes Absolute 0 47 0 17 - 1 22 Thousand/µL    Eosinophils Absolute 0 04 0 00 - 0 61 Thousand/µL    Basophils Absolute 0 03 0 00 - 0 10 Thousands/µL   Comprehensive metabolic panel   Result Value Ref Range    Sodium 137 136 - 145 mmol/L    Potassium 4 2 3 5 - 5 3 mmol/L    Chloride 105 100 - 108 mmol/L    CO2 30 21 - 32 mmol/L    ANION GAP 2 (L) 4 - 13 mmol/L    BUN 19 5 - 25 mg/dL    Creatinine 1 02 0 60 - 1 30 mg/dL    Glucose 102 65 - 140 mg/dL    Calcium 9 1 8 3 - 10 1 mg/dL    AST 13 5 - 45 U/L    ALT 27 12 - 78 U/L    Alkaline Phosphatase 48 46 - 116 U/L    Total Protein 7 7 6 4 - 8 2 g/dL Albumin 4 4 3 5 - 5 0 g/dL    Total Bilirubin 0 86 0 20 - 1 00 mg/dL    eGFR 99 ml/min/1 73sq m       Portions of the record may have been created with voice recognition software  Occasional wrong word or "sound a like" substitutions may have occurred due to the inherent limitations of voice recognition software  Read the chart carefully and recognize, using context, where substitutions have occurred

## 2020-08-12 NOTE — PATIENT INSTRUCTIONS
-Please buy blood pressure machine (like [employer]) and check blood pressure at home regularly   -If blood pressure remains >130/85, please call back office    -blood test before office visit  -low salt diet  -Bring blood pressure machine to next office visit    Low-Sodium Diet   WHAT YOU NEED TO KNOW:   A low-sodium diet limits foods that are high in sodium (salt)  You will need to follow a low-sodium diet if you have high blood pressure, kidney disease, or heart failure  You may also need to follow this diet if you have a condition that is causing your body to retain (hold) extra fluid  You may need to limit the amount of sodium you eat to 1,500 mg  Ask your healthcare provider how much sodium you can have each day  DISCHARGE INSTRUCTIONS:   How to use food labels to choose foods that are low in sodium:  Read food labels to find the amount of sodium they contain  The amount of sodium is listed in milligrams (mg)  The % Daily Value (DV) column tells you how much of your daily needs are met by 1 serving of the food for each nutrient listed  Choose foods that have less than 5% of the DV of sodium  These foods are considered low in sodium  Foods that have 20% or more of the DV of sodium are considered high in sodium  Some food labels may also list any of the following terms that tell you about the sodium content in the food:  · Sodium-free:  Less than 5 mg in each serving    · Very low sodium:  35 mg of sodium or less in each serving    · Low sodium:  140 mg of sodium or less in each serving    · Reduced sodium: At least 25% less sodium in each serving than the regular type    · Light in sodium:  50% less sodium in each serving    · Unsalted or no added salt:  No extra salt is added during processing (the food may still contain sodium)  Foods to avoid:  Salty foods are high in sodium   You should avoid the following:  · Processed foods:      ¨ Mixes for cornbread, biscuits, cake, and pudding     ¨ Instant foods, such as potatoes, cereals, noodles, and rice     ¨ Packaged foods, such as bread stuffing, rice and pasta mixes, snack dip mixes, and macaroni and cheese     ¨ Canned foods, such as canned vegetables, soups, broths, sauces, and vegetable or tomato juice    ¨ Snack foods, such as salted chips, popcorn, pretzels, pork rinds, salted crackers, and salted nuts    ¨ Frozen foods, such as dinners, entrees, vegetables with sauces, and breaded meats    ¨ Sauerkraut, pickled vegetables, and other foods prepared in brine    · Meats and cheeses:      ¨ Smoked or cured meat, such as corned beef, tena, ham, hot dogs, and sausage    ¨ Canned meats or spreads, such as potted meats, sardines, anchovies, and imitation seafood    ¨ Deli or lunch meats, such as bologna, ham, turkey, and roast beef    ¨ Processed cheese, such as American cheese and cheese spreads    · Condiments, sauces, and seasonings:      ¨ Salt (¼ teaspoon of salt contains 575 mg of sodium)    ¨ Seasonings made with salt, such as garlic salt, celery salt, onion salt, and seasoned salt    ¨ Regular soy sauce, barbecue sauce, teriyaki sauce, steak sauce, Worcestershire sauce, and most flavored vinegars    ¨ Canned gravy and mixes     ¨ Regular condiments, such as mustard, ketchup, and salad dressings    ¨ Pickles and olives    ¨ Meat tenderizers and monosodium glutamate (MSG)  Foods to include:  Read the food label to find the amount of sodium in each serving  · Bread and cereal:  Try to choose breads with less than 80 mg of sodium per serving  ¨ Bread, roll, kaci, tortilla, or unsalted crackers  ¨ Ready-to-eat cereals with less than 5% DV of sodium (examples include shredded wheat and puffed rice)    ¨ Pasta    · Vegetables and fruits:      ¨ Unsalted fresh, frozen, or canned vegetables    ¨ Fresh, frozen, or canned fruits    ¨ Fruit juice    · Dairy:  One serving has about 150 mg of sodium      ¨ Milk, all types    ¨ Yogurt    ¨ Hard cheese, such as cheddar, Swiss, [employer], or mozzarella    · Meat and other protein foods:  Some raw meats may have added sodium  ¨ Plain meats, fish, and poultry     ¨ Egg    · Other foods:      ¨ Homemade pudding    ¨ Unsalted nuts, popcorn, or pretzels    ¨ Unsalted butter or margarine  Ways to decrease sodium:   · Add spices and herbs to foods instead of salt during cooking  Use salt-free seasonings to add flavor to foods  Examples include onion powder, garlic powder, basil, jane powder, paprika, and parsley  Try lemon or lime juice or vinegar to give foods a tart flavor  Use hot peppers, pepper, or cayenne pepper to add a spicy flavor to foods  · Do not keep a salt shaker at your kitchen table  This may help keep you from adding salt to food at the table  It may take time to get used to enjoying the natural flavor of food instead of adding salt  Talk to your healthcare provider before you use salt substitutes  Some salt substitutes have a high amount of potassium and need to be avoided if you have kidney disease  · Choose low-sodium foods at restaurants  Meals from restaurants are often high in sodium  Some restaurants have nutrition information on the menu that tells you the amount of sodium in their foods  If possible, ask for your food to be prepared with less, or no salt  · Shop for unsalted or low-sodium foods and snacks at the grocery store  Examples include unsalted or low-sodium broths, soups, and canned vegetables  Choose fresh or frozen vegetables instead  Choose unsalted nuts or seeds or fresh fruits or vegetables as snacks  Read food labels and choose salt-free, very low-sodium, or low-sodium foods  © 2017 [employer] Information is for End User's use only and may not be sold, redistributed or otherwise used for commercial purposes  All illustrations and images included in CareNotes® are the copyrighted property of A [employer] M [employer]  or Jose C Villalba    The above information is an  only  It is not intended as medical advice for individual conditions or treatments  Talk to your doctor, nurse or pharmacist before following any medical regimen to see if it is safe and effective for you

## 2020-08-19 DIAGNOSIS — I10 ELEVATED BLOOD PRESSURE READING IN OFFICE WITH DIAGNOSIS OF HYPERTENSION: ICD-10-CM

## 2020-08-19 RX ORDER — HYDROCHLOROTHIAZIDE 25 MG/1
TABLET ORAL
Qty: 90 TABLET | Refills: 0 | Status: SHIPPED | OUTPATIENT
Start: 2020-08-19 | End: 2020-09-17 | Stop reason: ALTCHOICE

## 2020-09-03 ENCOUNTER — HOSPITAL ENCOUNTER (OUTPATIENT)
Dept: NON INVASIVE DIAGNOSTICS | Facility: HOSPITAL | Age: 30
Discharge: HOME | End: 2020-09-03
Attending: INTERNAL MEDICINE
Payer: COMMERCIAL

## 2020-09-03 DIAGNOSIS — R00.2 INTERMITTENT PALPITATIONS: ICD-10-CM

## 2020-09-03 DIAGNOSIS — I10 ELEVATED BLOOD PRESSURE READING IN OFFICE WITH DIAGNOSIS OF HYPERTENSION: ICD-10-CM

## 2020-09-03 DIAGNOSIS — R06.02 SOB (SHORTNESS OF BREATH): ICD-10-CM

## 2020-09-03 PROCEDURE — 93306 TTE W/DOPPLER COMPLETE: CPT

## 2020-09-03 PROCEDURE — 93306 TTE W/DOPPLER COMPLETE: CPT | Performed by: INTERNAL MEDICINE

## 2020-09-17 ENCOUNTER — OFFICE VISIT (OUTPATIENT)
Dept: CARDIOLOGY CLINIC | Facility: CLINIC | Age: 30
End: 2020-09-17
Payer: COMMERCIAL

## 2020-09-17 VITALS
DIASTOLIC BLOOD PRESSURE: 96 MMHG | OXYGEN SATURATION: 96 % | HEIGHT: 68 IN | SYSTOLIC BLOOD PRESSURE: 148 MMHG | HEART RATE: 96 BPM | WEIGHT: 168.6 LBS | BODY MASS INDEX: 25.55 KG/M2

## 2020-09-17 DIAGNOSIS — I10 ELEVATED BLOOD PRESSURE READING IN OFFICE WITH DIAGNOSIS OF HYPERTENSION: Primary | ICD-10-CM

## 2020-09-17 DIAGNOSIS — R06.02 SOB (SHORTNESS OF BREATH): ICD-10-CM

## 2020-09-17 PROCEDURE — 99214 OFFICE O/P EST MOD 30 MIN: CPT | Performed by: INTERNAL MEDICINE

## 2020-09-17 RX ORDER — LISINOPRIL 20 MG/1
20 TABLET ORAL DAILY
Qty: 30 TABLET | Refills: 3 | Status: SHIPPED | OUTPATIENT
Start: 2020-09-17 | End: 2020-11-04 | Stop reason: SINTOL

## 2020-09-17 NOTE — PROGRESS NOTES
Cardiology Consultation     Ang Canales  [id number]  1990  [address] [facility] [address] [id number]    1  Elevated blood pressure reading in office with diagnosis of hypertension     2  SOB (shortness of breath)       Chief Complaint   Patient presents with    Follow-up     resulta echo     HPI: Patient is here for evaluation and management of HTN  Has some occasional palpitations and shortness of breath  No reported chest pain,  lightheadedness, syncope, LE edema, orthopnea, PND, or significant weight changes  Patient remains active without any increased fatigue out of the ordinary  No significant change to symptoms since last visit      Patient Active Problem List   Diagnosis    Cellulitis of left ear    Abscess of left external ear    Periorbital cellulitis of left eye    Tobacco abuse    Insomnia    Rupture of left anterior cruciate ligament    Sprain of medial collateral ligament of left knee    Contracture, elbow, left    Closed fracture of left distal humerus    Anxiety    Vitamin D deficiency    Intermittent palpitations    Elevated blood pressure reading in office with diagnosis of hypertension    SOB (shortness of breath)     Past Medical History:   Diagnosis Date    Anxiety     HTN (hypertension)     Insomnia      Social History     Socioeconomic History    Marital status: Single     Spouse name: Not on file    Number of children: Not on file    Years of education: Not on file    Highest education level: Not on file   Occupational History    Occupation:    Social Needs    Financial resource strain: Not on file    Food insecurity     Worry: Not on file     Inability: Not on file    Transportation needs     Medical: Not on file     Non-medical: Not on file   Tobacco Use    Smoking status: Former Smoker     Packs/day: 1 50     Years: 15 00     Pack years: 22 50     Types: Cigarettes     Last attempt to quit: 2018     Years since quittin 4    Smokeless tobacco: Current User    Tobacco comment: vaping 3mg nicotine QD    Substance and Sexual Activity    Alcohol use: Yes     Frequency: 2-4 times a month    Drug use: Yes     Types: Marijuana    Sexual activity: Not on file   Lifestyle    Physical activity     Days per week: Not on file     Minutes per session: Not on file    Stress: Not on file   Relationships    Social connections     Talks on phone: Not on file     Gets together: Not on file     Attends Anabaptist service: Not on file     Active member of club or organization: Not on file     Attends meetings of clubs or organizations: Not on file     Relationship status: Not on file    Intimate partner violence     Fear of current or ex partner: Not on file     Emotionally abused: Not on file     Physically abused: Not on file     Forced sexual activity: Not on file   Other Topics Concern    Not on file   Social History Narrative    Not on file      Family History   Problem Relation Age of Onset    No Known Problems Mother     Depression Father     No Known Problems Maternal Grandmother     Coronary artery disease Maternal Grandfather    GF: PPM    Past Surgical History:   Procedure Laterality Date    FRACTURE SURGERY      left elbow       Current Outpatient Medications:     hydrochlorothiazide (HYDRODIURIL) 25 mg tablet, TAKE 1 TABLET BY MOUTH EVERY DAY, Disp: 90 tablet, Rfl: 0    Cholecalciferol (VITAMIN D3) 50 MCG (2000 UT) TABS, Take 2,000 Units by mouth daily, Disp: , Rfl:   No Known Allergies  Vitals:    20 0947   BP: 148/96   BP Location: Left arm   Patient Position: Sitting   Cuff Size: Standard   Pulse: 96   SpO2: 96%   Weight: 76 5 kg (168 lb 9 6 oz)   Height: 5' 8" (1 727 m)       Labs:  No visits with results within 6 Month(s) from this visit     Latest known visit with results is:   Appointment on 2020   Component Date Value    RPR 02/14/2020 Non-Reactive     HIV-1/HIV-2 Ab 02/14/2020 Non-Reactive     N gonorrhoeae, DNA Probe 02/14/2020 Negative     Chlamydia trachomatis, D* 02/14/2020 Negative     Hepatitis B Surface Ag 02/14/2020 Non-reactive     Hep A IgM 02/14/2020 Non-reactive     Hepatitis C Ab 02/14/2020 Non-reactive     Hep B C IgM 02/14/2020 Non-reactive     WBC 02/14/2020 6 06     RBC 02/14/2020 5 87*    Hemoglobin 02/14/2020 16 4     Hematocrit 02/14/2020 49 0     MCV 02/14/2020 84     MCH 02/14/2020 27 9     MCHC 02/14/2020 33 5     RDW 02/14/2020 11 9     MPV 02/14/2020 11 8     Platelets 78/52/0912 233     nRBC 02/14/2020 0     Neutrophils Relative 02/14/2020 53     Immat GRANS % 02/14/2020 0     Lymphocytes Relative 02/14/2020 37     Monocytes Relative 02/14/2020 8     Eosinophils Relative 02/14/2020 1     Basophils Relative 02/14/2020 1     Neutrophils Absolute 02/14/2020 3 26     Immature Grans Absolute 02/14/2020 0 01     Lymphocytes Absolute 02/14/2020 2 25     Monocytes Absolute 02/14/2020 0 47     Eosinophils Absolute 02/14/2020 0 04     Basophils Absolute 02/14/2020 0 03     Sodium 02/14/2020 137     Potassium 02/14/2020 4 2     Chloride 02/14/2020 105     CO2 02/14/2020 30     ANION GAP 02/14/2020 2*    BUN 02/14/2020 19     Creatinine 02/14/2020 1 02     Glucose 02/14/2020 102     Calcium 02/14/2020 9 1     AST 02/14/2020 13     ALT 02/14/2020 27     Alkaline Phosphatase 02/14/2020 48     Total Protein 02/14/2020 7 7     Albumin 02/14/2020 4 4     Total Bilirubin 02/14/2020 0 86     eGFR 02/14/2020 99      No results found for: CHOL, TRIG, HDL, LDLDIRECT  Imaging: No results found  Review of Systems:  Review of Systems   Constitutional: Negative for activity change, appetite change, fatigue and fever  HENT: Negative for nosebleeds and sore throat  Eyes: Negative for photophobia and visual disturbance     Respiratory: Negative for cough, chest tightness, shortness of breath and wheezing  Cardiovascular: Negative for chest pain, palpitations and leg swelling  Gastrointestinal: Negative for abdominal pain, diarrhea, nausea and vomiting  Endocrine: Negative for polyuria  Genitourinary: Negative for dysuria, frequency and hematuria  Musculoskeletal: Negative for arthralgias, back pain and gait problem  Skin: Negative for pallor and rash  Neurological: Negative for dizziness, syncope, speech difficulty and light-headedness  Hematological: Does not bruise/bleed easily  Psychiatric/Behavioral: Negative for agitation, behavioral problems and confusion  Physical Exam:  Physical Exam  Vitals signs reviewed  Constitutional:       Appearance: He is well-developed  He is not diaphoretic  HENT:      Head: Normocephalic and atraumatic  Nose: Nose normal    Eyes:      General: No scleral icterus  Pupils: Pupils are equal, round, and reactive to light  Neck:      Musculoskeletal: Normal range of motion and neck supple  Vascular: No JVD  Cardiovascular:      Rate and Rhythm: Normal rate and regular rhythm  Heart sounds: Normal heart sounds  No murmur  No friction rub  No gallop  Pulmonary:      Effort: Pulmonary effort is normal  No respiratory distress  Breath sounds: Normal breath sounds  No wheezing or rales  Abdominal:      General: Bowel sounds are normal  There is no distension  Palpations: Abdomen is soft  Tenderness: There is no abdominal tenderness  Musculoskeletal: Normal range of motion  General: No deformity  Skin:     General: Skin is warm and dry  Findings: No rash  Neurological:      Mental Status: He is alert and oriented to person, place, and time  Cranial Nerves: No cranial nerve deficit  Psychiatric:         Behavior: Behavior normal        Blood pressure 148/96, pulse 96, height 5' 8" (1 727 m), weight 76 5 kg (168 lb 9 6 oz), SpO2 96 %      EKG:  Normal sinus rhythm  Normal ECG    Discussion/Summary:  HTN: mildly elevated today, continued on HCTZ, which has been on board for a few weeks so far  He just started running a few weeks ago as well  Normal baseline ECG  Has some SOB and palpitations, but unlikely to be rhythm related or ischemic  We checked an echo to assess for structural heart disease, which was normal   Will change HCTZ to lisinopril to see if that will have a better response to BP

## 2020-09-17 NOTE — PATIENT INSTRUCTIONS

## 2020-09-18 ENCOUNTER — OFFICE VISIT (OUTPATIENT)
Dept: FAMILY MEDICINE CLINIC | Facility: CLINIC | Age: 30
End: 2020-09-18
Payer: COMMERCIAL

## 2020-09-18 VITALS
TEMPERATURE: 98.2 F | HEIGHT: 68 IN | OXYGEN SATURATION: 98 % | WEIGHT: 170.8 LBS | HEART RATE: 82 BPM | BODY MASS INDEX: 25.88 KG/M2 | SYSTOLIC BLOOD PRESSURE: 124 MMHG | RESPIRATION RATE: 18 BRPM | DIASTOLIC BLOOD PRESSURE: 60 MMHG

## 2020-09-18 DIAGNOSIS — Z87.891 FORMER SMOKER: ICD-10-CM

## 2020-09-18 DIAGNOSIS — Z72.0 CURRENT EVERY DAY NICOTINE VAPING: ICD-10-CM

## 2020-09-18 DIAGNOSIS — I10 ESSENTIAL HYPERTENSION: Primary | ICD-10-CM

## 2020-09-18 DIAGNOSIS — Z28.21 INFLUENZA VACCINATION DECLINED BY PATIENT: ICD-10-CM

## 2020-09-18 DIAGNOSIS — R00.2 INTERMITTENT PALPITATIONS: ICD-10-CM

## 2020-09-18 PROCEDURE — 3078F DIAST BP <80 MM HG: CPT | Performed by: FAMILY MEDICINE

## 2020-09-18 PROCEDURE — 3074F SYST BP LT 130 MM HG: CPT | Performed by: FAMILY MEDICINE

## 2020-09-18 PROCEDURE — 99213 OFFICE O/P EST LOW 20 MIN: CPT | Performed by: FAMILY MEDICINE

## 2020-09-18 NOTE — PROGRESS NOTES
Assessment/Plan:   Diagnoses and all orders for this visit:    Essential hypertension  - BP in the office within range x2  - was started on ACEI 20mg QD per Cardio whom he saw for f/u 9/17/2020   - nml EKG and ECHO  - FHx of MGF with CAD (age unknown), denies FHx of sudden cardiac death before 36   - former smoker - quit 2yrs ago (1PPD/14yrs), does currently vaping with 3mg nicotine - advised smoking cessation   - denies illicits  - has decreased his fast food intake to 2x/week vs QD   - advised to limit Na to 2g/day and avoid NSAIDs    - has f/u scheduled with Nephro on 10/2/2020 and with Cardio 11/24/2020   - RTO in 6months for annual exam - pt aware and agreeable   Intermittent palpitations  Former smoker  Current every day nicotine vaping  Influenza vaccination declined by patient          Subjective:    Patient ID: Ashish Ybarra is a 27 y o  male    30yo M presents to the office for f/u of HTN   - was evaluated by Cardio and Nephro   - had a nml ECHO   - saw Cardio 9/17/2020 - HCTZ was changed to ACEI 20mg QD  - has f/u scheduled with Nephro for 10/2/2020 - labs pending   - states had a HA this AM on ACEI but it resolved as day progressed  - has started walking after work   - denies F/C/N/V/change in vision/CP/SOB/wheezing/cough/abd pain/D/C/LE edema   - still gets palpitations but doesn't really notice them   - former smoker but does vape nicotine  - has changed his diet - fast food 1-2x/week (vs 1-2x/day)   - has been watching intake of frozen foods, canned soups and Na snacks   - declined Flu vaccine in the office today       The following portions of the patient's history were reviewed and updated as appropriate: allergies, current medications, past family history, past medical history, past social history, past surgical history and problem list     Review of Systems  as per HPI    Objective:  /60 (BP Location: Left arm, Patient Position: Sitting, Cuff Size: Standard)   Pulse 82   Temp 98 2 °F (36 8 °C)   Resp 18   Ht 5' 8" (1 727 m)   Wt 77 5 kg (170 lb 12 8 oz)   SpO2 98%   BMI 25 97 kg/m²    Physical Exam  Vitals signs reviewed  Constitutional:       General: He is not in acute distress  Appearance: Normal appearance  He is normal weight  He is not ill-appearing, toxic-appearing or diaphoretic  HENT:      Head: Normocephalic and atraumatic  Eyes:      General: No scleral icterus  Right eye: No discharge  Left eye: No discharge  Extraocular Movements: Extraocular movements intact  Conjunctiva/sclera: Conjunctivae normal    Neck:      Musculoskeletal: Normal range of motion  Cardiovascular:      Rate and Rhythm: Normal rate and regular rhythm  Heart sounds: Normal heart sounds  No murmur  No friction rub  No gallop  Pulmonary:      Effort: Pulmonary effort is normal  No respiratory distress  Breath sounds: Normal breath sounds  No stridor  No wheezing, rhonchi or rales  Abdominal:      General: Abdomen is flat  Bowel sounds are normal       Palpations: Abdomen is soft  Musculoskeletal: Normal range of motion  General: No swelling, tenderness, deformity or signs of injury  Right lower leg: No edema  Left lower leg: No edema  Skin:     General: Skin is warm  Neurological:      General: No focal deficit present  Mental Status: He is alert and oriented to person, place, and time  Psychiatric:         Mood and Affect: Mood normal          Behavior: Behavior normal          BMI Counseling: Body mass index is 25 97 kg/m²  The BMI is above normal  Nutrition recommendations include reducing portion sizes, decreasing overall calorie intake, reducing fast food intake and consuming healthier snacks  Exercise recommendations include moderate aerobic physical activity for 150 minutes/week, exercising 3-5 times per week and joining a gym

## 2020-09-30 ENCOUNTER — TELEPHONE (OUTPATIENT)
Dept: NEPHROLOGY | Facility: CLINIC | Age: 30
End: 2020-09-30

## 2020-09-30 NOTE — TELEPHONE ENCOUNTER
I tried calling patient to remind him of his appointment with dr Julian Soto on 10/2 and there is blood and urine tests to be done for the appointment  Voicemail is full and I could not leave a message

## 2020-10-02 ENCOUNTER — OFFICE VISIT (OUTPATIENT)
Dept: NEPHROLOGY | Facility: CLINIC | Age: 30
End: 2020-10-02
Payer: COMMERCIAL

## 2020-10-02 VITALS
SYSTOLIC BLOOD PRESSURE: 124 MMHG | TEMPERATURE: 98.6 F | BODY MASS INDEX: 26.04 KG/M2 | DIASTOLIC BLOOD PRESSURE: 66 MMHG | HEIGHT: 68 IN | WEIGHT: 171.8 LBS

## 2020-10-02 DIAGNOSIS — I10 ESSENTIAL HYPERTENSION: Primary | ICD-10-CM

## 2020-10-02 DIAGNOSIS — E55.9 VITAMIN D DEFICIENCY: ICD-10-CM

## 2020-10-02 PROCEDURE — 99213 OFFICE O/P EST LOW 20 MIN: CPT | Performed by: INTERNAL MEDICINE

## 2020-10-02 PROCEDURE — 1036F TOBACCO NON-USER: CPT | Performed by: INTERNAL MEDICINE

## 2020-11-04 DIAGNOSIS — I10 ESSENTIAL HYPERTENSION: Primary | ICD-10-CM

## 2020-11-04 RX ORDER — AMLODIPINE BESYLATE 5 MG/1
5 TABLET ORAL DAILY
Qty: 30 TABLET | Refills: 3 | Status: SHIPPED | OUTPATIENT
Start: 2020-11-04 | End: 2021-01-26

## 2020-11-24 ENCOUNTER — OFFICE VISIT (OUTPATIENT)
Dept: CARDIOLOGY CLINIC | Facility: CLINIC | Age: 30
End: 2020-11-24
Payer: COMMERCIAL

## 2020-11-24 VITALS — BODY MASS INDEX: 26.07 KG/M2 | HEIGHT: 68 IN | WEIGHT: 172 LBS

## 2020-11-24 DIAGNOSIS — I10 ESSENTIAL HYPERTENSION: Primary | ICD-10-CM

## 2020-11-24 PROCEDURE — 99214 OFFICE O/P EST MOD 30 MIN: CPT | Performed by: INTERNAL MEDICINE

## 2020-11-24 PROCEDURE — 3008F BODY MASS INDEX DOCD: CPT | Performed by: INTERNAL MEDICINE

## 2020-11-24 PROCEDURE — 1036F TOBACCO NON-USER: CPT | Performed by: INTERNAL MEDICINE

## 2021-01-26 DIAGNOSIS — I10 ESSENTIAL HYPERTENSION: ICD-10-CM

## 2021-01-26 RX ORDER — AMLODIPINE BESYLATE 5 MG/1
TABLET ORAL
Qty: 90 TABLET | Refills: 1 | Status: SHIPPED | OUTPATIENT
Start: 2021-01-26 | End: 2021-08-27

## 2021-01-29 ENCOUNTER — TELEPHONE (OUTPATIENT)
Dept: NEPHROLOGY | Facility: CLINIC | Age: 31
End: 2021-01-29

## 2021-01-29 NOTE — TELEPHONE ENCOUNTER
I called patient to schedule his March follow up with Dr Bev Prieto  No answer  Voicemail box was full

## 2021-08-27 ENCOUNTER — OFFICE VISIT (OUTPATIENT)
Dept: FAMILY MEDICINE CLINIC | Facility: CLINIC | Age: 31
End: 2021-08-27
Payer: COMMERCIAL

## 2021-08-27 VITALS
TEMPERATURE: 98.6 F | BODY MASS INDEX: 26.98 KG/M2 | OXYGEN SATURATION: 97 % | DIASTOLIC BLOOD PRESSURE: 78 MMHG | HEART RATE: 92 BPM | SYSTOLIC BLOOD PRESSURE: 118 MMHG | WEIGHT: 178 LBS | HEIGHT: 68 IN

## 2021-08-27 DIAGNOSIS — M25.522 LEFT ELBOW PAIN: Primary | ICD-10-CM

## 2021-08-27 DIAGNOSIS — S83.512S RUPTURE OF ANTERIOR CRUCIATE LIGAMENT OF LEFT KNEE, SEQUELA: ICD-10-CM

## 2021-08-27 DIAGNOSIS — M25.562 ACUTE PAIN OF LEFT KNEE: ICD-10-CM

## 2021-08-27 PROCEDURE — 99214 OFFICE O/P EST MOD 30 MIN: CPT | Performed by: FAMILY MEDICINE

## 2021-08-27 PROCEDURE — 3008F BODY MASS INDEX DOCD: CPT | Performed by: FAMILY MEDICINE

## 2021-08-27 PROCEDURE — 3725F SCREEN DEPRESSION PERFORMED: CPT | Performed by: FAMILY MEDICINE

## 2021-08-27 PROCEDURE — 1036F TOBACCO NON-USER: CPT | Performed by: FAMILY MEDICINE

## 2021-08-27 RX ORDER — NAPROXEN 500 MG/1
500 TABLET ORAL 2 TIMES DAILY WITH MEALS
Qty: 28 TABLET | Refills: 0 | Status: SHIPPED | OUTPATIENT
Start: 2021-08-27

## 2021-08-27 NOTE — PROGRESS NOTES
Assessment/Plan:   Diagnoses and all orders for this visit:    Left elbow pain  -     naproxen (NAPROSYN) 500 mg tablet; Take 1 tablet (500 mg total) by mouth 2 (two) times a day with meals  - h/o L-elbow fracture s/p repair  - acute on chronic pain   - will treat with NSAIDs and advised to f/u with Ortho - pt aware and agreeable     Acute pain of left knee  -     Ambulatory referral to Orthopedic Surgery; Future  -     naproxen (NAPROSYN) 500 mg tablet; Take 1 tablet (500 mg total) by mouth 2 (two) times a day with meals  - h/o ACL rupture of L-knee w/o intervention in 2014   - has felt his knee "give out"   - temp pain relief with NSAIDs but advised to f/u with Ortho - pt aware and agreeable   Rupture of anterior cruciate ligament of left knee, sequela  -     Ambulatory referral to Orthopedic Surgery; Future          Subjective:    Patient ID: Jimmy Early is a 32 y o  male  HPI   - (+) L-elbow and L-knee pain for the past few months  - tore L-ACL 7yrs ago - did not get surgery - has "given out" on him a few times   - shattered L-elbow 6yrs ago s/p repair   - (+) intermittent throbbing pain in both joints - has not tried anything so far for this   - works 12hrs/day - repetitive movements       The following portions of the patient's history were reviewed and updated as appropriate: allergies, current medications, past family history, past medical history, past social history, past surgical history and problem list     Review of Systems  as per HPI    Objective:  /78   Pulse 92   Temp 98 6 °F (37 °C) (Tympanic)   Ht 5' 8" (1 727 m)   Wt 80 7 kg (178 lb)   SpO2 97%   BMI 27 06 kg/m²    Physical Exam  Vitals reviewed  Constitutional:       General: He is not in acute distress  Appearance: Normal appearance  He is not ill-appearing, toxic-appearing or diaphoretic  HENT:      Head: Normocephalic and atraumatic        Right Ear: External ear normal       Left Ear: External ear normal    Eyes: General: No scleral icterus  Right eye: No discharge  Left eye: No discharge  Extraocular Movements: Extraocular movements intact  Conjunctiva/sclera: Conjunctivae normal    Pulmonary:      Effort: Pulmonary effort is normal    Musculoskeletal:         General: No swelling, tenderness, deformity or signs of injury  Normal range of motion  Cervical back: Normal range of motion  Comments: +5/5 LUE strength, no pain with movements    Skin:     General: Skin is warm  Neurological:      General: No focal deficit present  Mental Status: He is alert and oriented to person, place, and time  Psychiatric:         Mood and Affect: Mood normal          Behavior: Behavior normal        BMI Counseling: Body mass index is 27 06 kg/m²  The BMI is above normal  Nutrition recommendations include reducing portion sizes and decreasing overall calorie intake  Exercise recommendations include moderate aerobic physical activity for 150 minutes/week, exercising 3-5 times per week, joining a gym and strength training exercises

## 2021-09-08 ENCOUNTER — APPOINTMENT (OUTPATIENT)
Dept: RADIOLOGY | Facility: AMBULARY SURGERY CENTER | Age: 31
End: 2021-09-08
Payer: COMMERCIAL

## 2021-09-08 ENCOUNTER — OFFICE VISIT (OUTPATIENT)
Dept: OBGYN CLINIC | Facility: CLINIC | Age: 31
End: 2021-09-08
Payer: COMMERCIAL

## 2021-09-08 VITALS
SYSTOLIC BLOOD PRESSURE: 145 MMHG | HEIGHT: 68 IN | BODY MASS INDEX: 25.91 KG/M2 | WEIGHT: 171 LBS | HEART RATE: 82 BPM | DIASTOLIC BLOOD PRESSURE: 85 MMHG

## 2021-09-08 DIAGNOSIS — M25.562 ACUTE PAIN OF LEFT KNEE: ICD-10-CM

## 2021-09-08 DIAGNOSIS — S83.512S RUPTURE OF ANTERIOR CRUCIATE LIGAMENT OF LEFT KNEE, SEQUELA: ICD-10-CM

## 2021-09-08 PROCEDURE — 3008F BODY MASS INDEX DOCD: CPT | Performed by: ORTHOPAEDIC SURGERY

## 2021-09-08 PROCEDURE — 99203 OFFICE O/P NEW LOW 30 MIN: CPT | Performed by: ORTHOPAEDIC SURGERY

## 2021-09-08 PROCEDURE — 73562 X-RAY EXAM OF KNEE 3: CPT

## 2021-09-08 PROCEDURE — 1036F TOBACCO NON-USER: CPT | Performed by: ORTHOPAEDIC SURGERY

## 2021-09-08 NOTE — PROGRESS NOTES
Patient Name:  Gisselle Trent  MRN:  [id number]    Assessment & Plan     L knee pain, history of ACL rupture  1  Continue NSAIDS/Naproxen he has from PCP  2  Will order MRI and follow up after imaging to discuss findings/treatment options    Chief Complaint     L knee pain for 6 months    History of the Present Illness     Gisselle Trent is a 32 y o  male who presents with L knee pain for 6-7 months  He jumped off a table and felt a pop and pain  He has pain in the morning and trying to sleep at night  He has taken naproxen as needed with some relief  He reports popping when he walks or turns  No giving out now  He reports an ACL tear in 2014 but did not have surgery  He wore a brace for a month at initial injury  No PT  Physical Exam     /85   Pulse 82   Ht 5' 8" (1 727 m)   Wt 77 6 kg (171 lb)   BMI 26 00 kg/m²      L knee TTP over patellar tendon  painful patellar compression  Full active and passive ROM  No effusion  Neg varus and valgus  Negative Demetra  Somewhat soft lachman endpoint compared to the Right side  Calf supple  Skin no erythema or warmth  Eyes: Anicteric sclerae  ENT: Trachea midline  Lungs: Normal respiratory effort  CV: Capillary refill is less than 2 seconds  Skin: Intact without erythema  Lymph: No palpable lymphadenopathy  Neuro: Sensation is grossly intact to light touch  Psych: Mood and affect are appropriate  Data Review     XR done here in the office 9/8/21 no acute findings    Past Medical History:   Diagnosis Date    Anxiety     HTN (hypertension)     Insomnia        Past Surgical History:   Procedure Laterality Date    FRACTURE SURGERY      left elbow       No Known Allergies    Current Outpatient Medications on File Prior to Visit   Medication Sig Dispense Refill    naproxen (NAPROSYN) 500 mg tablet Take 1 tablet (500 mg total) by mouth 2 (two) times a day with meals 28 tablet 0     No current facility-administered medications on file prior to visit  Social History     Tobacco Use    Smoking status: Former Smoker     Packs/day: 1 50     Years: 15 00     Pack years: 22 50     Types: Cigarettes     Quit date: 4/4/2018     Years since quitting: 3 4    Smokeless tobacco: Current User    Tobacco comment: vaping 3mg nicotine QD    Vaping Use    Vaping Use: Former    Quit date: 3/27/2021    Substances: Nicotine, THC, Flavoring   Substance Use Topics    Alcohol use:  Yes    Drug use: Yes     Types: Marijuana       Family History   Problem Relation Age of Onset    No Known Problems Mother     Depression Father     No Known Problems Maternal Grandmother     Coronary artery disease Maternal Grandfather              Scribe Attestation    I,:  Brad Rome PA-C am acting as a scribe while in the presence of the attending physician :       I,:  Anshul Soliz MD personally performed the services described in this documentation    as scribed in my presence :

## 2021-10-21 ENCOUNTER — OFFICE VISIT (OUTPATIENT)
Dept: URGENT CARE | Facility: CLINIC | Age: 31
End: 2021-10-21
Payer: COMMERCIAL

## 2021-10-21 VITALS
DIASTOLIC BLOOD PRESSURE: 80 MMHG | OXYGEN SATURATION: 95 % | RESPIRATION RATE: 16 BRPM | HEIGHT: 68 IN | BODY MASS INDEX: 25.98 KG/M2 | TEMPERATURE: 98.2 F | WEIGHT: 171.4 LBS | SYSTOLIC BLOOD PRESSURE: 145 MMHG | HEART RATE: 63 BPM

## 2021-10-21 DIAGNOSIS — Z02.4 DRIVER'S PERMIT PE (PHYSICAL EXAMINATION): Primary | ICD-10-CM

## 2021-10-21 PROCEDURE — 99213 OFFICE O/P EST LOW 20 MIN: CPT | Performed by: NURSE PRACTITIONER

## 2022-02-11 DIAGNOSIS — Z11.52 ENCOUNTER FOR SCREENING FOR COVID-19: Primary | ICD-10-CM

## 2022-02-21 ENCOUNTER — OFFICE VISIT (OUTPATIENT)
Dept: URGENT CARE | Facility: CLINIC | Age: 32
End: 2022-02-21
Payer: COMMERCIAL

## 2022-02-21 VITALS
HEART RATE: 81 BPM | DIASTOLIC BLOOD PRESSURE: 81 MMHG | TEMPERATURE: 99 F | BODY MASS INDEX: 25.91 KG/M2 | HEIGHT: 68 IN | WEIGHT: 171 LBS | SYSTOLIC BLOOD PRESSURE: 134 MMHG | RESPIRATION RATE: 16 BRPM

## 2022-02-21 DIAGNOSIS — R36.9 PENILE DISCHARGE: Primary | ICD-10-CM

## 2022-02-21 LAB
SL AMB  POCT GLUCOSE, UA: NEGATIVE
SL AMB LEUKOCYTE ESTERASE,UA: NEGATIVE
SL AMB POCT BILIRUBIN,UA: NEGATIVE
SL AMB POCT BLOOD,UA: NEGATIVE
SL AMB POCT CLARITY,UA: CLEAR
SL AMB POCT COLOR,UA: NORMAL
SL AMB POCT KETONES,UA: NEGATIVE
SL AMB POCT NITRITE,UA: NEGATIVE
SL AMB POCT PH,UA: 7.5
SL AMB POCT SPECIFIC GRAVITY,UA: 1.01
SL AMB POCT URINE PROTEIN: NEGATIVE
SL AMB POCT UROBILINOGEN: NEGATIVE

## 2022-02-21 PROCEDURE — 87491 CHLMYD TRACH DNA AMP PROBE: CPT

## 2022-02-21 PROCEDURE — 87591 N.GONORRHOEAE DNA AMP PROB: CPT

## 2022-02-21 PROCEDURE — 99213 OFFICE O/P EST LOW 20 MIN: CPT

## 2022-02-21 PROCEDURE — 96372 THER/PROPH/DIAG INJ SC/IM: CPT

## 2022-02-21 RX ORDER — CEFTRIAXONE SODIUM 250 MG/1
250 INJECTION, POWDER, FOR SOLUTION INTRAMUSCULAR; INTRAVENOUS ONCE
Status: COMPLETED | OUTPATIENT
Start: 2022-02-21 | End: 2022-02-21

## 2022-02-21 RX ORDER — AZITHROMYCIN 500 MG/1
1000 TABLET, FILM COATED ORAL DAILY
Qty: 2 TABLET | Refills: 0 | Status: SHIPPED | OUTPATIENT
Start: 2022-02-21 | End: 2022-02-22

## 2022-02-21 RX ORDER — LIDOCAINE HYDROCHLORIDE 10 MG/ML
0.9 INJECTION, SOLUTION EPIDURAL; INFILTRATION; INTRACAUDAL; PERINEURAL ONCE
Status: SHIPPED | OUTPATIENT
Start: 2022-02-21

## 2022-02-21 RX ADMIN — CEFTRIAXONE SODIUM 250 MG: 250 INJECTION, POWDER, FOR SOLUTION INTRAMUSCULAR; INTRAVENOUS at 17:30

## 2022-02-21 NOTE — PROGRESS NOTES
[facility]        NAME: Marianna Kearney is a 32 y o  male  : 1990    MRN: [id number]  DATE: 2022  TIME: 5:50 PM    Assessment and Plan   Penile discharge [R36 9]  1  Penile discharge  cefTRIAXone (ROCEPHIN) injection 250 mg    azithromycin (ZITHROMAX) 500 MG tablet    lidocaine (PF) (XYLOCAINE-MPF) 1 % injection 0 9 mL    Chlamydia/GC amplified DNA by PCR    POCT urine dip   Plan to treat empirically for GC/Chlamydia, will send urine for culture  Urine dip in-office negative  Patient Instructions   Practice safe sex until follow up with full STI panel  Report to ED if testicular swelling, fever, bloody discharge, flank pain  Follow up with PCP in 3-5 days  Proceed to  ER if symptoms worsen  Chief Complaint     Chief Complaint   Patient presents with    Penile Discharge     had unprotected sex approx  10 days ago, states the girl wwas tested and was neg for STDs but he strted with thin clear discharge about 3 days ago  Has some discomfort inscrotum and lower back  History of Present Illness       Patient is a 32 y o  male who presents for chief complaint of penile discharge for the past four days  Patient reports he had unprotected intercourse approximately 10 days ago, after which his female partner bled  He reports that this had happened before, so she had been evaluated at the Ob/Gyn's office and told her she "tested negative for everything"  He reports the discharge as intermittent and clear to cloudy in color  He also reports 3/10 testicular pain over the past four or five days  Of note, he states that he has had GC/Chlamydia in the past, but it was different from what he is currently experiencing  He also notes some urinary frequency  Reports that he is monogamous  Denies fever, dysuria, flank pain, hematuria, testicular swelling, joint pain  Review of Systems   Review of Systems   Constitutional: Negative for chills and fever     HENT: Negative for ear pain, rhinorrhea, sinus pressure, sinus pain and sore throat  Eyes: Negative for pain and visual disturbance  Respiratory: Negative for cough and shortness of breath  Cardiovascular: Negative for chest pain and palpitations  Gastrointestinal: Negative for abdominal pain, diarrhea, nausea and vomiting  Endocrine: Negative  Genitourinary: Positive for penile discharge, testicular pain and urgency  Negative for decreased urine volume, dysuria, flank pain, frequency, genital sores, hematuria, penile pain, penile swelling and scrotal swelling  Musculoskeletal: Negative for arthralgias, back pain, joint swelling, myalgias, neck pain and neck stiffness  Skin: Negative for color change and rash  Allergic/Immunologic: Negative  Neurological: Negative for dizziness, seizures, syncope, facial asymmetry, light-headedness, numbness and headaches  Hematological: Negative  Psychiatric/Behavioral: Negative  All other systems reviewed and are negative          Current Medications       Current Outpatient Medications:     azithromycin (ZITHROMAX) 500 MG tablet, Take 2 tablets (1,000 mg total) by mouth daily for 1 day, Disp: 2 tablet, Rfl: 0    naproxen (NAPROSYN) 500 mg tablet, Take 1 tablet (500 mg total) by mouth 2 (two) times a day with meals (Patient not taking: Reported on 2/21/2022 ), Disp: 28 tablet, Rfl: 0    Current Facility-Administered Medications:     cefTRIAXone (ROCEPHIN) injection 250 mg, 250 mg, Intramuscular, Once, BABATUNDE Vega    lidocaine (PF) (XYLOCAINE-MPF) 1 % injection 0 9 mL, 0 9 mL, Infiltration, Once, BABATUNDE Vega    Current Allergies     Allergies as of 02/21/2022    (No Known Allergies)            The following portions of the patient's history were reviewed and updated as appropriate: allergies, current medications, past family history, past medical history, past social history, past surgical history and problem list      Past Medical History:   Diagnosis Date    Anxiety     HTN (hypertension)     Insomnia        Past Surgical History:   Procedure Laterality Date    FRACTURE SURGERY      left elbow       Family History   Problem Relation Age of Onset    No Known Problems Mother     Depression Father     No Known Problems Maternal Grandmother     Coronary artery disease Maternal Grandfather          Medications have been verified  Objective   /81 (Patient Position: Sitting)   Pulse 81   Temp 99 °F (37 2 °C) (Temporal)   Resp 16   Ht 5' 8" (1 727 m)   Wt 77 6 kg (171 lb)   BMI 26 00 kg/m²        Physical Exam     Physical Exam  Exam conducted with a chaperone present  Constitutional:       Appearance: Normal appearance  HENT:      Head: Normocephalic and atraumatic  Nose: Nose normal       Mouth/Throat:      Mouth: Mucous membranes are moist    Eyes:      Extraocular Movements: Extraocular movements intact  Pupils: Pupils are equal, round, and reactive to light  Cardiovascular:      Rate and Rhythm: Normal rate and regular rhythm  Pulmonary:      Effort: Pulmonary effort is normal    Abdominal:      Tenderness: There is no right CVA tenderness or left CVA tenderness  Hernia: There is no hernia in the left inguinal area or right inguinal area  Genitourinary:     Pubic Area: No rash or pubic lice  Penis: Normal and circumcised  No phimosis, paraphimosis, hypospadias, erythema, tenderness, discharge, swelling or lesions  Testes:         Right: Mass, tenderness, swelling, testicular hydrocele or varicocele not present  Right testis is descended  Cremasteric reflex is present  Left: Mass, tenderness, swelling, testicular hydrocele or varicocele not present  Left testis is descended  Cremasteric reflex is present  Epididymis:      Right: Normal       Left: Normal       Comments: Normal  exam of male, no discharge noted  Musculoskeletal:         General: Normal range of motion        Cervical back: Normal range of motion and neck supple  No rigidity or tenderness  Lymphadenopathy:      Lower Body: No right inguinal adenopathy  No left inguinal adenopathy  Skin:     General: Skin is warm and dry  Capillary Refill: Capillary refill takes less than 2 seconds  Neurological:      General: No focal deficit present  Mental Status: He is alert     Psychiatric:         Mood and Affect: Mood normal

## 2022-02-21 NOTE — PATIENT INSTRUCTIONS
Sexually Transmitted Diseases   WHAT YOU NEED TO KNOW:   A sexually transmitted disease (STD) means signs or symptoms of a sexually transmitted infection (STI) have developed  An STI happens when bacteria or a virus are spread through oral, genital, or anal sex  Some examples of STDs are HIV, chlamydia, syphilis, and gonorrhea  DISCHARGE INSTRUCTIONS:   Return to the emergency department if:   · You have genital swelling or pain, or unusual bleeding  · You have joint pain, a rash, swollen lymph nodes, or night sweats  · You have severe abdominal pain  Call your doctor if:   · You have a fever  · Your symptoms do not go away or get worse, even after treatment  · You have bleeding or pain during sex  · You or your female partner may be pregnant  · You have questions or concerns about your condition or care  Medicines: You may need any of the following:  · Antibiotics  may be given for a bacterial infection  · Antivirals  may be given for a viral infection  · Antifungals  may be given for a fungal infection, such as a yeast infection  · Take your medicine as directed  Contact your healthcare provider if you think your medicine is not helping or if you have side effects  Tell him of her if you are allergic to any medicine  Keep a list of the medicines, vitamins, and herbs you take  Include the amounts, and when and why you take them  Bring the list or the pill bottles to follow-up visits  Carry your medicine list with you in case of an emergency  Help prevent the spread of an STI:  Ask your healthcare provider for more information about the following safe sex practices:  · Use a male or female condom during sex  This includes oral, genital, or anal sex  Use a new condom each time  Condoms help prevent pregnancy and STIs  Use latex condoms, if possible  Lambskin (also called sheepskin or natural membrane) condoms do not protect against STIs   A polyurethane condom can be used if you or your partner is allergic to latex  Condoms should be used with a second form of birth control to help prevent pregnancy and STIs  Do not use male and female condoms together  · Do not have sex with someone who has an STI or STD  This includes oral and anal sex  · Limit sex partners  Ask about your partner's sex history before you have sex  · Get screened regularly if you are sexually active  Common tests include chlamydia, gonorrhea, HIV, hepatitis, and syphilis  · Tell your sex partners if you have an STI  Your partners may need to be tested and treated  Do not have sex while you are being treated for an STI  Do not have sex with a partner who is being treated  · Ask about medicines to lower your risk for some STIs:      ? Vaccines  can help protect you from hepatitis A, hepatitis B, and the human papillomavirus (HPV)  The HPV vaccine is usually given at 11 years, but it may be given through 26 years to both females and males  Your provider can give you more information on vaccines to prevent STIs  ? Pre-exposure prophylaxis (PrEP)  may be given if you are at high risk for HIV  PrEP is taken every day to prevent the virus from fully infecting the body  · If you are a woman, do not douche  Douching upsets the normal balance of bacteria found in your vagina  It does not prevent or clear up vaginal infections  Follow up with your doctor as directed: You may need more tests  If you have an STD, you may need immediate or ongoing treatment  Your doctor may also refer you to a specialist who can provide specific treatment  Write down your questions so you remember to ask them during your visits  © Copyright [employer] 2021 Information is for End User's use only and may not be sold, redistributed or otherwise used for commercial purposes   All illustrations and images included in CareNotes® are the copyrighted property of [employer]  or [facility] Krys Catalan   The above information is an  only  It is not intended as medical advice for individual conditions or treatments  Talk to your doctor, nurse or pharmacist before following any medical regimen to see if it is safe and effective for you

## 2022-02-22 LAB
C TRACH DNA SPEC QL NAA+PROBE: POSITIVE
N GONORRHOEA DNA SPEC QL NAA+PROBE: NEGATIVE

## 2025-01-14 ENCOUNTER — TELEPHONE (OUTPATIENT)
Age: 35
End: 2025-01-14

## 2025-01-14 NOTE — TELEPHONE ENCOUNTER
Patient gave verbal consent to  girlfrientali Loreta to discuss patients chart and concerns.Patient is requesting a  new referral for a cardiologist.Old referral is in chart from 2020 due to patients hypertension.

## 2025-02-06 ENCOUNTER — APPOINTMENT (EMERGENCY)
Dept: RADIOLOGY | Facility: HOSPITAL | Age: 35
End: 2025-02-06
Payer: COMMERCIAL

## 2025-02-06 ENCOUNTER — HOSPITAL ENCOUNTER (EMERGENCY)
Facility: HOSPITAL | Age: 35
Discharge: HOME | End: 2025-02-06
Attending: EMERGENCY MEDICINE
Payer: COMMERCIAL

## 2025-02-06 VITALS
DIASTOLIC BLOOD PRESSURE: 83 MMHG | TEMPERATURE: 98.4 F | SYSTOLIC BLOOD PRESSURE: 140 MMHG | RESPIRATION RATE: 18 BRPM | OXYGEN SATURATION: 95 % | HEART RATE: 80 BPM

## 2025-02-06 DIAGNOSIS — R07.89 ATYPICAL CHEST PAIN: ICD-10-CM

## 2025-02-06 DIAGNOSIS — R00.2 HEART PALPITATIONS: Primary | ICD-10-CM

## 2025-02-06 LAB
ALBUMIN SERPL BCG-MCNC: 4.7 G/DL (ref 3.5–5)
ALP SERPL-CCNC: 38 U/L (ref 34–104)
ALT SERPL W P-5'-P-CCNC: 24 U/L (ref 7–52)
ANION GAP SERPL CALCULATED.3IONS-SCNC: 7 MMOL/L (ref 4–13)
AST SERPL W P-5'-P-CCNC: 16 U/L (ref 13–39)
BASOPHILS # BLD AUTO: 0.03 THOUSANDS/ΜL (ref 0–0.1)
BASOPHILS NFR BLD AUTO: 1 % (ref 0–1)
BILIRUB SERPL-MCNC: 0.49 MG/DL (ref 0.2–1)
BUN SERPL-MCNC: 18 MG/DL (ref 5–25)
CALCIUM SERPL-MCNC: 9.1 MG/DL (ref 8.4–10.2)
CARDIAC TROPONIN I PNL SERPL HS: <2 NG/L (ref ?–50)
CHLORIDE SERPL-SCNC: 103 MMOL/L (ref 96–108)
CO2 SERPL-SCNC: 26 MMOL/L (ref 21–32)
CREAT SERPL-MCNC: 0.9 MG/DL (ref 0.6–1.3)
EOSINOPHIL # BLD AUTO: 0.13 THOUSAND/ΜL (ref 0–0.61)
EOSINOPHIL NFR BLD AUTO: 2 % (ref 0–6)
ERYTHROCYTE [DISTWIDTH] IN BLOOD BY AUTOMATED COUNT: 11.9 % (ref 11.6–15.1)
GFR SERPL CREATININE-BSD FRML MDRD: 111 ML/MIN/1.73SQ M
GLUCOSE SERPL-MCNC: 134 MG/DL (ref 65–140)
HCT VFR BLD AUTO: 44.6 % (ref 36.5–49.3)
HGB BLD-MCNC: 15.7 G/DL (ref 12–17)
IMM GRANULOCYTES # BLD AUTO: 0.01 THOUSAND/UL (ref 0–0.2)
IMM GRANULOCYTES NFR BLD AUTO: 0 % (ref 0–2)
LYMPHOCYTES # BLD AUTO: 2.56 THOUSANDS/ΜL (ref 0.6–4.47)
LYMPHOCYTES NFR BLD AUTO: 39 % (ref 14–44)
MCH RBC QN AUTO: 28.2 PG (ref 26.8–34.3)
MCHC RBC AUTO-ENTMCNC: 35.2 G/DL (ref 31.4–37.4)
MCV RBC AUTO: 80 FL (ref 82–98)
MONOCYTES # BLD AUTO: 0.47 THOUSAND/ΜL (ref 0.17–1.22)
MONOCYTES NFR BLD AUTO: 7 % (ref 4–12)
NEUTROPHILS # BLD AUTO: 3.45 THOUSANDS/ΜL (ref 1.85–7.62)
NEUTS SEG NFR BLD AUTO: 51 % (ref 43–75)
NRBC BLD AUTO-RTO: 0 /100 WBCS
PLATELET # BLD AUTO: 238 THOUSANDS/UL (ref 149–390)
PMV BLD AUTO: 10.1 FL (ref 8.9–12.7)
POTASSIUM SERPL-SCNC: 3.6 MMOL/L (ref 3.5–5.3)
PROT SERPL-MCNC: 7 G/DL (ref 6.4–8.4)
RBC # BLD AUTO: 5.57 MILLION/UL (ref 3.88–5.62)
SODIUM SERPL-SCNC: 136 MMOL/L (ref 135–147)
WBC # BLD AUTO: 6.65 THOUSAND/UL (ref 4.31–10.16)

## 2025-02-06 PROCEDURE — 80053 COMPREHEN METABOLIC PANEL: CPT

## 2025-02-06 PROCEDURE — 99285 EMERGENCY DEPT VISIT HI MDM: CPT

## 2025-02-06 PROCEDURE — 71046 X-RAY EXAM CHEST 2 VIEWS: CPT

## 2025-02-06 PROCEDURE — 93005 ELECTROCARDIOGRAM TRACING: CPT

## 2025-02-06 PROCEDURE — 99285 EMERGENCY DEPT VISIT HI MDM: CPT | Performed by: EMERGENCY MEDICINE

## 2025-02-06 PROCEDURE — 36415 COLL VENOUS BLD VENIPUNCTURE: CPT

## 2025-02-06 PROCEDURE — 84484 ASSAY OF TROPONIN QUANT: CPT

## 2025-02-06 PROCEDURE — 85025 COMPLETE CBC W/AUTO DIFF WBC: CPT

## 2025-02-06 NOTE — Clinical Note
Rogers Fonseca was seen and treated in our emergency department on 2/6/2025.                Diagnosis:     Rogers  may return to work on return date.    He may return on this date: 02/10/2025         If you have any questions or concerns, please don't hesitate to call.      Niyah Dennis MD    ______________________________           _______________          _______________  Hospital Representative                              Date                                Time

## 2025-02-07 ENCOUNTER — OFFICE VISIT (OUTPATIENT)
Dept: FAMILY MEDICINE CLINIC | Facility: CLINIC | Age: 35
End: 2025-02-07
Payer: COMMERCIAL

## 2025-02-07 VITALS
HEIGHT: 68 IN | DIASTOLIC BLOOD PRESSURE: 82 MMHG | HEART RATE: 73 BPM | WEIGHT: 195 LBS | SYSTOLIC BLOOD PRESSURE: 132 MMHG | BODY MASS INDEX: 29.55 KG/M2 | OXYGEN SATURATION: 97 %

## 2025-02-07 DIAGNOSIS — R06.02 SOB (SHORTNESS OF BREATH): ICD-10-CM

## 2025-02-07 DIAGNOSIS — R00.2 INTERMITTENT PALPITATIONS: ICD-10-CM

## 2025-02-07 DIAGNOSIS — Z28.21 INFLUENZA VACCINATION DECLINED BY PATIENT: ICD-10-CM

## 2025-02-07 DIAGNOSIS — R03.0 ELEVATED BLOOD PRESSURE READING WITHOUT DIAGNOSIS OF HYPERTENSION: ICD-10-CM

## 2025-02-07 DIAGNOSIS — F41.9 ANXIETY: ICD-10-CM

## 2025-02-07 DIAGNOSIS — Z00.00 ANNUAL PHYSICAL EXAM: ICD-10-CM

## 2025-02-07 DIAGNOSIS — Z76.89 ENCOUNTER TO ESTABLISH CARE: Primary | ICD-10-CM

## 2025-02-07 DIAGNOSIS — E55.9 VITAMIN D DEFICIENCY: ICD-10-CM

## 2025-02-07 DIAGNOSIS — F41.0 PANIC ATTACKS: ICD-10-CM

## 2025-02-07 DIAGNOSIS — Z13.220 SCREENING CHOLESTEROL LEVEL: ICD-10-CM

## 2025-02-07 DIAGNOSIS — F17.290 VAPING NICOTINE DEPENDENCE, TOBACCO PRODUCT: ICD-10-CM

## 2025-02-07 DIAGNOSIS — Z53.20 MEDICATION THERAPY MANAGEMENT RECOMMENDATION DECLINED BY PATIENT: ICD-10-CM

## 2025-02-07 DIAGNOSIS — Z13.0 SCREENING FOR DEFICIENCY ANEMIA: ICD-10-CM

## 2025-02-07 DIAGNOSIS — Z87.891 FORMER SMOKER: ICD-10-CM

## 2025-02-07 LAB
ATRIAL RATE: 95 BPM
P AXIS: 34 DEGREES
PR INTERVAL: 156 MS
QRS AXIS: 86 DEGREES
QRSD INTERVAL: 102 MS
QT INTERVAL: 338 MS
QTC INTERVAL: 424 MS
T WAVE AXIS: 20 DEGREES
VENTRICULAR RATE: 95 BPM

## 2025-02-07 PROCEDURE — 93010 ELECTROCARDIOGRAM REPORT: CPT | Performed by: INTERNAL MEDICINE

## 2025-02-07 PROCEDURE — 99204 OFFICE O/P NEW MOD 45 MIN: CPT | Performed by: FAMILY MEDICINE

## 2025-02-07 PROCEDURE — 99385 PREV VISIT NEW AGE 18-39: CPT | Performed by: FAMILY MEDICINE

## 2025-02-07 NOTE — ASSESSMENT & PLAN NOTE
"- not too bad for a while  - a couple weeks ago - had coffee and an espresso - \"thought I was going to die\"   - since then anxiety has been \"horrible\" - now more panic attacks   - used to take Xanax for anxiety, but then would not be able to remember things/fall asleep and self d/c'd - does not want to restart meds   - (+) FHx of Depression in Father - currently not taking any medications   - has not followed with Therapist and would rather not see one   - declined medication in the office today   - RTO in 2wks, with labs, for f/u - pt aware and agreeable   Orders:    TSH, 3rd generation with Free T4 reflex    "

## 2025-02-07 NOTE — PATIENT INSTRUCTIONS
"Patient Education     Routine physical for adults   The Basics   Written by the doctors and editors at [facility]   What is a physical? -- A physical is a routine visit, or \"check-up,\" with your doctor. You might also hear it called a \"wellness visit\" or \"preventive visit.\"  During each visit, the doctor will:   Ask about your physical and mental health   Ask about your habits, behaviors, and lifestyle   Do an exam   Give you vaccines if needed   Talk to you about any medicines you take   Give advice about your health   Answer your questions  Getting regular check-ups is an important part of taking care of your health. It can help your doctor find and treat any problems you have. But it's also important for preventing health problems.  A routine physical is different from a \"sick visit.\" A sick visit is when you see a doctor because of a health concern or problem. Since physicals are scheduled ahead of time, you can think about what you want to ask the doctor.  How often should I get a physical? -- It depends on your age and health. In general, for people age 21 years and older:   If you are younger than 50 years, you might be able to get a physical every 3 years.   If you are 50 years or older, your doctor might recommend a physical every year.  If you have an ongoing health condition, like diabetes or high blood pressure, your doctor will probably want to see you more often.  What happens during a physical? -- In general, each visit will include:   Physical exam - The doctor or nurse will check your height, weight, heart rate, and blood pressure. They will also look at your eyes and ears. They will ask about how you are feeling and whether you have any symptoms that bother you.   Medicines - It's a good idea to bring a list of all the medicines you take to each doctor visit. Your doctor will talk to you about your medicines and answer any questions. Tell them if you are having any side effects that bother you. You " "should also tell them if you are having trouble paying for any of your medicines.   Habits and behaviors - This includes:   Your diet   Your exercise habits   Whether you smoke, drink alcohol, or use drugs   Whether you are sexually active   Whether you feel safe at home  Your doctor will talk to you about things you can do to improve your health and lower your risk of health problems. They will also offer help and support. For example, if you want to quit smoking, they can give you advice and might prescribe medicines. If you want to improve your diet or get more physical activity, they can help you with this, too.   Lab tests, if needed - The tests you get will depend on your age and situation. For example, your doctor might want to check your:   Cholesterol   Blood sugar   Iron level   Vaccines - The recommended vaccines will depend on your age, health, and what vaccines you already had. Vaccines are very important because they can prevent certain serious or deadly infections.   Discussion of screening - \"Screening\" means checking for diseases or other health problems before they cause symptoms. Your doctor can recommend screening based on your age, risk, and preferences. This might include tests to check for:   Cancer, such as breast, prostate, cervical, ovarian, colorectal, prostate, lung, or skin cancer   Sexually transmitted infections, such as chlamydia and gonorrhea   Mental health conditions like depression and anxiety  Your doctor will talk to you about the different types of screening tests. They can help you decide which screenings to have. They can also explain what the results might mean.   Answering questions - The physical is a good time to ask the doctor or nurse questions about your health. If needed, they can refer you to other doctors or specialists, too.  Adults older than 65 years often need other care, too. As you get older, your doctor will talk to you about:   How to prevent falling at " home   Hearing or vision tests   Memory testing   How to take your medicines safely   Making sure that you have the help and support you need at home  All topics are updated as new evidence becomes available and our peer review process is complete.  This topic retrieved from [employer] on: May 02, 2024.  Topic 320865 Version 1.0  Release: 32.4.3 - C32.122  © 2024 UpToDate, Inc. and/or its affiliates. All rights reserved.  Consumer Information Use and Disclaimer   Disclaimer: This generalized information is a limited summary of diagnosis, treatment, and/or medication information. It is not meant to be comprehensive and should be used as a tool to help the user understand and/or assess potential diagnostic and treatment options. It does NOT include all information about conditions, treatments, medications, side effects, or risks that may apply to a specific patient. It is not intended to be medical advice or a substitute for the medical advice, diagnosis, or treatment of a health care provider based on the health care provider's examination and assessment of a patient's specific and unique circumstances. Patients must speak with a health care provider for complete information about their health, medical questions, and treatment options, including any risks or benefits regarding use of medications. This information does not endorse any treatments or medications as safe, effective, or approved for treating a specific patient. UpToDate, Inc. and its affiliates disclaim any warranty or liability relating to this information or the use thereof.The use of this information is governed by the Terms of Use, available at https://www.[employer]uwer.com/en/know/clinical-effectiveness-terms. 2024© UpToDate, Inc. and its affiliates and/or licensors. All rights reserved.  Copyright   © 2024 UpToDate, Inc. and/or its affiliates. All rights reserved.

## 2025-02-07 NOTE — PROGRESS NOTES
"Name: Rogers Fonseca      : 1990      MRN: [id number]  Encounter Provider: Perla Treviño DO  Encounter Date: 2025   Encounter department: [facility]  :  Assessment & Plan  Encounter to establish care         Annual physical exam  - reviewed PMHx, PSHx, meds, allergies, FHx, Soc Hx and Sexual Hx   - last Tdap was   - UTD with J&J COVID IMM ()  - declined Flu vaccine in the office today   - due for screening labs - script given   - declined STD screening in the office today   - due for Colon Ca screening at 44yo   - due for Prostate Ca screening at 49yo   - overdue for Ophtho - states \"I need glasses\" - local referral given   - has Dental appt scheduled for next month   - diet/exercise   - RTO in 1yr for annual exam - pt aware and agreeable        Influenza vaccination declined by patient         Intermittent palpitations  - was seen in the ER yesterday for the same   - EKG = NSR, incomplete RBBB, No ST elevations or ischemic changes noted  - will check labs   - avoid caffeine - did note worsening s/s after drinking coffee  - former smoker  - advised vaping cessation   - RTO in 2wks, with labs, for f/u - pt aware and agreeable     Orders:    TSH, 3rd generation with Free T4 reflex    SOB (shortness of breath)         Vitamin D deficiency    Orders:    Vitamin D 25 hydroxy; Future    Anxiety  - not too bad for a while  - a couple weeks ago - had coffee and an espresso - \"thought I was going to die\"   - since then anxiety has been \"horrible\" - now more panic attacks   - used to take Xanax for anxiety, but then would not be able to remember things/fall asleep and self d/c'd - does not want to restart meds   - (+) FHx of Depression in Father - currently not taking any medications   - has not followed with Therapist and would rather not see one   - declined medication in the office today   - RTO in 2wks, with labs, for f/u - pt aware and agreeable   Orders:    TSH, 3rd generation " "with Free T4 reflex    Panic attacks    Orders:    TSH, 3rd generation with Free T4 reflex    Elevated blood pressure reading without diagnosis of hypertension  - BP stable in office   - used to be on ACEI in the past but stopped taking   - limit caffeine intake to 8-12oz/day   - former smoker  - advised vaping cessation   - caution with NSAIDs  - limit Na to 2g/day   - educational information re: DASH diet given to pt  - annual Ophtho   - declined annual Flu vaccine  - RTO in 2wks, with labs, for f/u - pt aware and agreeable   Orders:    Albumin / creatinine urine ratio; Future    Former smoker  - former smoker (quit 2018 - 1.5PPD/15yrs)       Vaping nicotine dependence, tobacco product  - advised vaping cessation        Screening for deficiency anemia    Orders:    CBC and differential    Iron Panel (Includes Ferritin, Iron Sat%, Iron, and TIBC); Future    Screening cholesterol level    Orders:    Lipid panel; Future    Medication therapy management recommendation declined by patient                History of Present Illness   Rogers Fonseca is a 34y.o. male who presents to the office to re-establish care, annual exam and eval of HTN   1) Establish Care/Annual Exam   - PMHx: Vit D deficiency, Anxiety, overweight (BMI 29.6), current vaper (stopped at 1030am this morning)   - allergies: NKDA  - Meds: none   - PSHx: fracture surgery of L-elbow (2016)   - FHx: F (Depression), MGF (CAD)  - Immunizations: Tdap (2016), UTD with J&J COVID IMM (2022), declined Flu vaccine in the office today   - diet/exercise: does not exercise, diet: \"not too good\"   - social: former smoker (quit 2018 - 1.5PPD/15yrs), (+) current vaping (3mg of Nicotine - stopped at 1030am this morning), rare social EtOH, denies illicits   - sexual Hx: no new partners in the past 3months, declined STD screening in the office today   - last vision: no glasses/contacts - \"I need glasses\"   - last dental: >1yr - has an appt scheduled for next month   2) " "Anxiety   - not too bad for a while  - a couple weeks ago - had coffee and an espresso - \"thought I was going to die\"   - since then anxiety has been \"horrible\" - now more panic attacks   - used to take Xanax for anxiety, but then would not be able to remember things/fall asleep and self d/c'd - does not want to restart meds   - (+) FHx of Depression in Father - currently not taking any medications   - has not followed with Therapist and would rather not see one   3) HTN   - BP in the office 132/82   - (+) HA, chest pain, intermittent palpitations, SOB, sweats   - drinks mostly water - some soda   - former smoker  - current vaper   - used to be on ACEI in the past but stopped taking         Hypertension  This is a recurrent problem. The current episode started 1 to 4 weeks ago. The problem has been waxing and waning since onset. The problem is resistant. Associated symptoms include anxiety, chest pain, headaches, orthopnea, palpitations, shortness of breath and sweats. Pertinent negatives include no blurred vision, malaise/fatigue, neck pain, peripheral edema or PND. There are no associated agents to hypertension. Risk factors for coronary artery disease include family history and smoking/tobacco exposure. There are no compliance problems.      Review of Systems   Constitutional:  Negative for malaise/fatigue.   Eyes:  Negative for blurred vision.   Respiratory:  Positive for shortness of breath.    Cardiovascular:  Positive for chest pain, palpitations and orthopnea. Negative for PND.   Musculoskeletal:  Negative for neck pain.   Neurological:  Positive for headaches.       Objective   /82   Pulse 73   Ht 5' 8\" (1.727 m)   Wt 88.5 kg (195 lb)   SpO2 97%   BMI 29.65 kg/m²      Physical Exam  Vitals reviewed.   Constitutional:       General: He is not in acute distress.     Appearance: Normal appearance. He is not ill-appearing, toxic-appearing or diaphoretic.   HENT:      Head: Normocephalic and " atraumatic.      Right Ear: External ear normal.      Left Ear: External ear normal.      Nose: Nose normal.   Eyes:      General: No scleral icterus.        Right eye: No discharge.         Left eye: No discharge.      Extraocular Movements: Extraocular movements intact.      Conjunctiva/sclera: Conjunctivae normal.   Cardiovascular:      Rate and Rhythm: Normal rate and regular rhythm.      Heart sounds: Normal heart sounds.   Pulmonary:      Effort: Pulmonary effort is normal. No respiratory distress.      Breath sounds: Normal breath sounds. No stridor. No wheezing, rhonchi or rales.   Abdominal:      Palpations: Abdomen is soft.   Musculoskeletal:         General: Normal range of motion.      Cervical back: Normal range of motion.      Right lower leg: No edema.      Left lower leg: No edema.   Skin:     General: Skin is warm.   Neurological:      General: No focal deficit present.      Mental Status: He is alert and oriented to person, place, and time.   Psychiatric:         Mood and Affect: Mood normal.         Behavior: Behavior normal.

## 2025-02-07 NOTE — ASSESSMENT & PLAN NOTE
- was seen in the ER yesterday for the same   - EKG = NSR, incomplete RBBB, No ST elevations or ischemic changes noted  - will check labs   - avoid caffeine - did note worsening s/s after drinking coffee  - former smoker  - advised vaping cessation   - RTO in 2wks, with labs, for f/u - pt aware and agreeable     Orders:    TSH, 3rd generation with Free T4 reflex

## 2025-02-07 NOTE — DISCHARGE INSTRUCTIONS
Please follow up with your PCP to discuss a holter monitor, to monitor your heart for a longer period of time.

## 2025-02-07 NOTE — ED PROVIDER NOTES
Time reflects when diagnosis was documented in both MDM as applicable and the Disposition within this note       Time User Action Codes Description Comment    2/6/2025 11:24 PM Awosikaren, Afopefoluwa Add [F41.0] Anxiety attack     2/6/2025 11:25 PM Awosika, Afopefoluwa Remove [F41.0] Anxiety attack     2/6/2025 11:26 PM Awosika, Afopefoluwa Add [R00.2] Heart palpitations     2/6/2025 11:26 PM Awosika, Afopefoluwa Add [R07.89] Atypical chest pain           ED Disposition       ED Disposition   Discharge    Condition   Stable    Date/Time   Thu Feb 6, 2025 11:24 PM    Comment   Rogers Fonseca discharge to home/self care.                   Assessment & Plan       Medical Decision Making  34-year-old male presents for evaluation of chest pain.    On initial evaluation, patient in no acute distress, resting comfortably in bed.  Differentials include but not limited to arrhythmia, anxiety attack, ACS, electrolyte abnormality.  Labs, EKG, CXR ordered.  Workup grossly benign.-Reassurance to patient, recommended follow-up with PCP for discussion about Holter monitor.  Patient is in agreement with this plan.    Amount and/or Complexity of Data Reviewed  Labs: ordered. Decision-making details documented in ED Course.  Radiology: ordered and independent interpretation performed.  ECG/medicine tests:  Decision-making details documented in ED Course.        ED Course as of 02/07/25 0037   Thu Feb 06, 2025   2217 ECG 12 lead  ED interpretation: NSR 5 bpm.  WI interval 156 ms,  ms, QTc 424 ms.  Normal axis.  Incomplete right bundle branch block.  No ST elevations or ischemic changes noted.   2322 hs TnI 0hr: <2  No need for 2hr trop         Medications - No data to display    ED Risk Strat Scores                          SBIRT 20yo+      Flowsheet Row Most Recent Value   Initial Alcohol Screen: US AUDIT-C     1. How often do you have a drink containing alcohol? 0 Filed at: 02/06/2025 2706   2. How many drinks containing alcohol  do you have on a typical day you are drinking?  0 Filed at: 2025   3a. Male UNDER 65: How often do you have five or more drinks on one occasion? 0 Filed at: 2025   Audit-C Score 0 Filed at: 2025   SAMANTA: How many times in the past year have you...    Used an illegal drug or used a prescription medication for non-medical reasons? Never Filed at: 2025                            History of Present Illness       Chief Complaint   Patient presents with    Chest Pain     Pt reports intermittent chest pain, dizziness, sob, and headaches x2 weeks. Took BP at home which was 171/104.        Past Medical History:   Diagnosis Date    Anxiety     HTN (hypertension)     Insomnia       Past Surgical History:   Procedure Laterality Date    FRACTURE SURGERY      left elbow      Family History   Problem Relation Age of Onset    No Known Problems Mother     Depression Father     No Known Problems Maternal Grandmother     Coronary artery disease Maternal Grandfather       Social History     Tobacco Use    Smoking status: Former     Current packs/day: 0.00     Average packs/day: 1.5 packs/day for 15.0 years (22.5 ttl pk-yrs)     Types: Cigarettes     Start date: 2003     Quit date: 2018     Years since quittin.8    Smokeless tobacco: Current    Tobacco comments:     vaping 3mg nicotine QD    Vaping Use    Vaping status: Former    Quit date: 3/27/2021    Substances: Nicotine, THC, Flavoring   Substance Use Topics    Alcohol use: Yes    Drug use: Yes     Types: Marijuana      E-Cigarette/Vaping    E-Cigarette Use Former User     Quit Date 3/27/21       E-Cigarette/Vaping Substances    Nicotine Yes     THC Yes     Flavoring Yes       I have reviewed and agree with the history as documented.     34-year-old male presents for evaluation of chest pain.  He reports 2 weeks of daily episodes of midsternal chest pain, nausea, diaphoresis, palpitations and SOB.  Episodes happen around noon  every day, and last for a few minutes until he is able to distract himself and then they self resolve.  He does report that he stopped drinking all caffeine, including coffee, energy drinks, soda about 3 weeks ago.  Denies additional stressors in his life.  At this time denies any symptoms.          Review of Systems   Constitutional:  Positive for diaphoresis. Negative for chills and fever.   Eyes:  Negative for pain and visual disturbance.   Respiratory:  Positive for shortness of breath. Negative for cough.    Cardiovascular:  Positive for chest pain and palpitations.   Gastrointestinal:  Positive for nausea. Negative for abdominal pain and vomiting.   Genitourinary:  Negative for dysuria and hematuria.   Musculoskeletal:  Negative for arthralgias and back pain.   Neurological:  Negative for seizures and syncope.   All other systems reviewed and are negative.          Objective       ED Triage Vitals   Temperature Pulse Blood Pressure Respirations SpO2 Patient Position - Orthostatic VS   02/06/25 2149 02/06/25 2147 02/06/25 2147 02/06/25 2147 02/06/25 2147 02/06/25 2315   98.4 °F (36.9 °C) 67 167/100 18 98 % Sitting      Temp Source Heart Rate Source BP Location FiO2 (%) Pain Score    02/06/25 2149 02/06/25 2147 02/06/25 2147 -- --    Oral Monitor Right arm        Vitals      Date and Time Temp Pulse SpO2 Resp BP Pain Score FACES Pain Rating User   02/06/25 2315 -- 80 95 % 18 140/83 -- -- CG   02/06/25 2216 -- 85 95 % -- 168/97 -- -- AA   02/06/25 2149 98.4 °F (36.9 °C) -- -- -- -- -- -- CLAUDIA   02/06/25 2147 -- 67 98 % 18 167/100 -- -- CLAUDIA            Physical Exam  Vitals and nursing note reviewed.   Constitutional:       General: He is not in acute distress.     Appearance: He is well-developed.   HENT:      Head: Normocephalic and atraumatic.      Mouth/Throat:      Mouth: Mucous membranes are moist.   Eyes:      Conjunctiva/sclera: Conjunctivae normal.   Cardiovascular:      Rate and Rhythm: Normal rate and  regular rhythm.      Heart sounds: No murmur heard.  Pulmonary:      Effort: Pulmonary effort is normal. No respiratory distress.      Breath sounds: Normal breath sounds.   Abdominal:      Palpations: Abdomen is soft.      Tenderness: There is no abdominal tenderness.   Musculoskeletal:      Cervical back: Neck supple.   Skin:     General: Skin is warm and dry.      Capillary Refill: Capillary refill takes less than 2 seconds.   Neurological:      General: No focal deficit present.      Mental Status: He is alert.   Psychiatric:         Mood and Affect: Mood normal.         Results Reviewed       Procedure Component Value Units Date/Time    HS Troponin 0hr (reflex protocol) [[id number]]  (Normal) Collected: 02/06/25 2236    Lab Status: Final result Specimen: Blood from Arm, Right Updated: 02/06/25 2312     hs TnI 0hr <2 ng/L     Comprehensive metabolic panel [[id number]] Collected: 02/06/25 2236    Lab Status: Final result Specimen: Blood from Arm, Right Updated: 02/06/25 2307     Sodium 136 mmol/L      Potassium 3.6 mmol/L      Chloride 103 mmol/L      CO2 26 mmol/L      ANION GAP 7 mmol/L      BUN 18 mg/dL      Creatinine 0.90 mg/dL      Glucose 134 mg/dL      Calcium 9.1 mg/dL      AST 16 U/L      ALT 24 U/L      Alkaline Phosphatase 38 U/L      Total Protein 7.0 g/dL      Albumin 4.7 g/dL      Total Bilirubin 0.49 mg/dL      eGFR 111 ml/min/1.73sq m     Narrative:      National Kidney Disease Foundation guidelines for Chronic Kidney Disease (CKD):     Stage 1 with normal or high GFR (GFR > 90 mL/min/1.73 square meters)    Stage 2 Mild CKD (GFR = 60-89 mL/min/1.73 square meters)    Stage 3A Moderate CKD (GFR = 45-59 mL/min/1.73 square meters)    Stage 3B Moderate CKD (GFR = 30-44 mL/min/1.73 square meters)    Stage 4 Severe CKD (GFR = 15-29 mL/min/1.73 square meters)    Stage 5 End Stage CKD (GFR <15 mL/min/1.73 square meters)  Note: GFR calculation is accurate only with a steady state creatinine    CBC and  differential [[id number]]  (Abnormal) Collected: 02/06/25 2236    Lab Status: Final result Specimen: Blood from Arm, Right Updated: 02/06/25 2247     WBC 6.65 Thousand/uL      RBC 5.57 Million/uL      Hemoglobin 15.7 g/dL      Hematocrit 44.6 %      MCV 80 fL      MCH 28.2 pg      MCHC 35.2 g/dL      RDW 11.9 %      MPV 10.1 fL      Platelets 238 Thousands/uL      nRBC 0 /100 WBCs      Segmented % 51 %      Immature Grans % 0 %      Lymphocytes % 39 %      Monocytes % 7 %      Eosinophils Relative 2 %      Basophils Relative 1 %      Absolute Neutrophils 3.45 Thousands/µL      Absolute Immature Grans 0.01 Thousand/uL      Absolute Lymphocytes 2.56 Thousands/µL      Absolute Monocytes 0.47 Thousand/µL      Eosinophils Absolute 0.13 Thousand/µL      Basophils Absolute 0.03 Thousands/µL             XR chest 2 views   ED Interpretation by Niyah Dennis MD (02/06 2303)   ED interpretation: No acute cardiopulmonary disease.          Procedures    ED Medication and Procedure Management   Prior to Admission Medications   Prescriptions Last Dose Informant Patient Reported? Taking?   naproxen (NAPROSYN) 500 mg tablet   No No   Sig: Take 1 tablet (500 mg total) by mouth 2 (two) times a day with meals   Patient not taking: Reported on 2/21/2022       Facility-Administered Medications Last Administration Doses Remaining   lidocaine (PF) (XYLOCAINE-MPF) 1 % injection 0.9 mL None recorded 1        Discharge Medication List as of 2/6/2025 11:28 PM        CONTINUE these medications which have NOT CHANGED    Details   naproxen (NAPROSYN) 500 mg tablet Take 1 tablet (500 mg total) by mouth 2 (two) times a day with meals, Starting Fri 8/27/2021, Normal           No discharge procedures on file.  ED SEPSIS DOCUMENTATION   Time reflects when diagnosis was documented in both MDM as applicable and the Disposition within this note       Time User Action Codes Description Comment    2/6/2025 11:24 PM Niyah Dennis Add [F41.0]  Anxiety attack     2/6/2025 11:25 PM Niyah Dennis Remove [F41.0] Anxiety attack     2/6/2025 11:26 PM Niyah Dennis Add [R00.2] Heart palpitations     2/6/2025 11:26 PM Niyah Dennis Add [R07.89] Atypical chest pain                  Niyah Dennis MD  02/07/25 0037

## 2025-02-07 NOTE — ED ATTENDING ATTESTATION
2/6/2025  IAngelo DO, saw and evaluated the patient. I have discussed the patient with the resident/non-physician practitioner and agree with the resident's/non-physician practitioner's findings, Plan of Care, and MDM as documented in the resident's/non-physician practitioner's note, except where noted. All available labs and Radiology studies were reviewed.  I was present for key portions of any procedure(s) performed by the resident/non-physician practitioner and I was immediately available to provide assistance.       At this point I agree with the current assessment done in the Emergency Department.  I have conducted an independent evaluation of this patient a history and physical is as follows:        1. Heart palpitations    2. Atypical chest pain            MDM  Number of Diagnoses or Management Options  Atypical chest pain  Heart palpitations  Diagnosis management comments:       Initial ED assessment:   34-year-old male, intermittent palpitations, intermittent chest tightness, had a prior to arrival does not have it now.  It has been occurring mainly over the past 4 days.  Has recently cut out caffeine.    Pathology at risk for includes but is not limited to:   Intermittent arrhythmia/SVT, anxiety, caffeine withdrawal, less likely cardiac etiology such as ACS.  Doubt pneumothorax    Initial ED plan:    Blood work, chest x-ray, cardiac monitoring, ultimate dispo pending the workup        Final ED summary/disposition:   After evaluation and workup in the emergency department, workup in ED unremarkable, no events on cardiac monitor, discussed about consideration of intermittent SVT, patient to be discharged will follow PCP  to consider outpatient Holter monitor.,  Return parameters discussed with the patient.               Time reflects when diagnosis was documented in both MDM as applicable and the Disposition within this note       Time User Action Codes Description Comment    2/6/2025 11:24 PM  Awosika, Afopefoluwa Add [F41.0] Anxiety attack     2/6/2025 11:25 PM Awosika, Afopefoluwa Remove [F41.0] Anxiety attack     2/6/2025 11:26 PM Awosika, Afopefoluwa Add [R00.2] Heart palpitations     2/6/2025 11:26 PM Awosika, Afopefoluwa Add [R07.89] Atypical chest pain           ED Disposition       ED Disposition   Discharge    Condition   Stable    Date/Time   Thu Feb 6, 2025 11:24 PM    Comment   Rogers Fonseca discharge to home/self care.                   Follow-up Information       Follow up With Specialties Details Why Contact Info    Perla Treviño,  Family Medicine Go in 1 week For discussion of a holter monitor for your heart [address] [facility] [address]  [phone]                            Chief Complaint   Patient presents with    Chest Pain     Pt reports intermittent chest pain, dizziness, sob, and headaches x2 weeks. Took BP at home which was 171/104.              34-year-old male, over the past 3 to 4 days having intermittent sensation of palpitations, occasionally feels tightness in his chest.,  Not having it right now, last anywhere from a few seconds to a few minutes, never more than 10 minutes.,  Occasionally feels lightheaded with it admits his anxiety does increase when it happens, does not strongly feel that anxiety is what triggered it.,  No change in stress or activity.,  Has decreased caffeine use/cut it out.       Chest Pain                Visit Vitals  /83 (BP Location: Right arm)   Pulse 80   Temp 98.4 °F (36.9 °C) (Oral)   Resp 18   SpO2 95%   Smoking Status Former        Physical Exam  Vitals reviewed.   Constitutional:       General: He is not in acute distress.     Appearance: He is well-developed. He is not diaphoretic.   HENT:      Head: Normocephalic and atraumatic.      Right Ear: External ear normal.      Left Ear: External ear normal.      Nose: Nose normal.   Eyes:      General:         Right eye: No discharge.         Left eye: No discharge.       Pupils: Pupils are equal, round, and reactive to light.   Neck:      Trachea: No tracheal deviation.   Cardiovascular:      Rate and Rhythm: Normal rate and regular rhythm.      Heart sounds: Normal heart sounds. No murmur heard.  Pulmonary:      Effort: Pulmonary effort is normal. No respiratory distress.      Breath sounds: Normal breath sounds. No stridor.   Abdominal:      General: There is no distension.      Palpations: Abdomen is soft.      Tenderness: There is no abdominal tenderness. There is no guarding or rebound.   Musculoskeletal:         General: Normal range of motion.      Cervical back: Normal range of motion and neck supple.   Skin:     General: Skin is warm and dry.      Coloration: Skin is not pale.      Findings: No erythema.   Neurological:      General: No focal deficit present.      Mental Status: He is alert and oriented to person, place, and time.                       Medications - No data to display          Labs Reviewed   CBC AND DIFFERENTIAL - Abnormal       Result Value Ref Range Status    WBC 6.65  4.31 - 10.16 Thousand/uL Final    RBC 5.57  3.88 - 5.62 Million/uL Final    Hemoglobin 15.7  12.0 - 17.0 g/dL Final    Hematocrit 44.6  36.5 - 49.3 % Final    MCV 80 (*) 82 - 98 fL Final    MCH 28.2  26.8 - 34.3 pg Final    MCHC 35.2  31.4 - 37.4 g/dL Final    RDW 11.9  11.6 - 15.1 % Final    MPV 10.1  8.9 - 12.7 fL Final    Platelets 238  149 - 390 Thousands/uL Final    nRBC 0  /100 WBCs Final    Segmented % 51  43 - 75 % Final    Immature Grans % 0  0 - 2 % Final    Lymphocytes % 39  14 - 44 % Final    Monocytes % 7  4 - 12 % Final    Eosinophils Relative 2  0 - 6 % Final    Basophils Relative 1  0 - 1 % Final    Absolute Neutrophils 3.45  1.85 - 7.62 Thousands/µL Final    Absolute Immature Grans 0.01  0.00 - 0.20 Thousand/uL Final    Absolute Lymphocytes 2.56  0.60 - 4.47 Thousands/µL Final    Absolute Monocytes 0.47  0.17 - 1.22 Thousand/µL Final    Eosinophils Absolute 0.13  0.00 -  "0.61 Thousand/µL Final    Basophils Absolute 0.03  0.00 - 0.10 Thousands/µL Final   HS TROPONIN I 0HR - Normal    hs TnI 0hr <2  \"Refer to ACS Flowchart\"- see link ng/L Final    Comment:                                              Initial (time 0) result  If >=50 ng/L, Myocardial injury suggested ;  Type of myocardial injury and treatment strategy  to be determined.  If 5-49 ng/L, a delta result at 2 hours and or 4 hours will be needed to further evaluate.  If <4 ng/L, and chest pain has been >3 hours since onset, patient may qualify for discharge based on the HEART score in the ED.  If <5 ng/L and <3hours since onset of chest pain, a delta result at 2 hours will be needed to further evaluate.    HS Troponin 99th Percentile URL of a Health Population=12 ng/L with a 95% Confidence Interval of 8-18 ng/L.    Second Troponin (time 2 hours)  If calculated delta >= 20 ng/L,  Myocardial injury suggested ; Type of myocardial injury and treatment strategy to be determined.  If 5-49 ng/L and the calculated delta is 5-19 ng/L, consult medical service for evaluation.  Continue evaluation for ischemia on ecg and other possible etiology and repeat hs troponin at 4 hours.  If delta is <5 ng/L at 2 hours, consider discharge based on risk stratification via the HEART score (if in ED), or NADER risk score in IP/Observation.    HS Troponin 99th Percentile URL of a Health Population=12 ng/L with a 95% Confidence Interval of 8-18 ng/L.   COMPREHENSIVE METABOLIC PANEL    Sodium 136  135 - 147 mmol/L Final    Potassium 3.6  3.5 - 5.3 mmol/L Final    Chloride 103  96 - 108 mmol/L Final    CO2 26  21 - 32 mmol/L Final    ANION GAP 7  4 - 13 mmol/L Final    BUN 18  5 - 25 mg/dL Final    Creatinine 0.90  0.60 - 1.30 mg/dL Final    Comment: Standardized to IDMS reference method    Glucose 134  65 - 140 mg/dL Final    Comment: If the patient is fasting, the ADA then defines impaired fasting glucose as > 100 mg/dL and diabetes as > or equal to " 123 mg/dL.    Calcium 9.1  8.4 - 10.2 mg/dL Final    AST 16  13 - 39 U/L Final    ALT 24  7 - 52 U/L Final    Comment: Specimen collection should occur prior to Sulfasalazine administration due to the potential for falsely depressed results.     Alkaline Phosphatase 38  34 - 104 U/L Final    Total Protein 7.0  6.4 - 8.4 g/dL Final    Albumin 4.7  3.5 - 5.0 g/dL Final    Total Bilirubin 0.49  0.20 - 1.00 mg/dL Final    Comment: Use of this assay is not recommended for patients undergoing treatment with eltrombopag due to the potential for falsely elevated results.  N-acetyl-p-benzoquinone imine (metabolite of Acetaminophen) will generate erroneously low results in samples for patients that have taken an overdose of Acetaminophen.    eGFR 111  ml/min/1.73sq m Final    Narrative:     National Kidney Disease Foundation guidelines for Chronic Kidney Disease (CKD):     Stage 1 with normal or high GFR (GFR > 90 mL/min/1.73 square meters)    Stage 2 Mild CKD (GFR = 60-89 mL/min/1.73 square meters)    Stage 3A Moderate CKD (GFR = 45-59 mL/min/1.73 square meters)    Stage 3B Moderate CKD (GFR = 30-44 mL/min/1.73 square meters)    Stage 4 Severe CKD (GFR = 15-29 mL/min/1.73 square meters)    Stage 5 End Stage CKD (GFR <15 mL/min/1.73 square meters)  Note: GFR calculation is accurate only with a steady state creatinine         XR chest 2 views   ED Interpretation   ED interpretation: No acute cardiopulmonary disease.                     ECG 12 Lead Documentation Only    Date/Time: 2/6/2025 10:35 PM    Performed by: Angelo Crum DO  Authorized by: Angelo Crum DO    ECG reviewed by me, the ED Provider: yes    Patient location:  ED  Previous ECG:     Previous ECG:  Unavailable  Interpretation:     Interpretation: non-specific    Rate:     ECG rate:  95    ECG rate assessment: normal    Rhythm:     Rhythm: sinus rhythm    Ectopy:     Ectopy: none    QRS:     QRS axis:  Normal    QRS intervals:  Normal  Conduction:      Conduction: abnormal      Abnormal conduction: incomplete RBBB    ST segments:     ST segments:  Non-specific  T waves:     T waves: non-specific

## 2025-03-03 ENCOUNTER — APPOINTMENT (OUTPATIENT)
Dept: LAB | Facility: CLINIC | Age: 35
End: 2025-03-03
Payer: COMMERCIAL

## 2025-03-03 DIAGNOSIS — R03.0 ELEVATED BLOOD PRESSURE READING WITHOUT DIAGNOSIS OF HYPERTENSION: ICD-10-CM

## 2025-03-03 DIAGNOSIS — E55.9 VITAMIN D DEFICIENCY: ICD-10-CM

## 2025-03-03 DIAGNOSIS — Z13.220 SCREENING CHOLESTEROL LEVEL: ICD-10-CM

## 2025-03-03 DIAGNOSIS — Z13.0 SCREENING FOR DEFICIENCY ANEMIA: ICD-10-CM

## 2025-03-03 LAB
25(OH)D3 SERPL-MCNC: 13.2 NG/ML (ref 30–100)
BASOPHILS # BLD AUTO: 0.02 THOUSANDS/ÂΜL (ref 0–0.1)
BASOPHILS NFR BLD AUTO: 0 % (ref 0–1)
CHOLEST SERPL-MCNC: 210 MG/DL (ref ?–200)
CREAT UR-MCNC: 119.5 MG/DL
EOSINOPHIL # BLD AUTO: 0.06 THOUSAND/ÂΜL (ref 0–0.61)
EOSINOPHIL NFR BLD AUTO: 1 % (ref 0–6)
ERYTHROCYTE [DISTWIDTH] IN BLOOD BY AUTOMATED COUNT: 12.1 % (ref 11.6–15.1)
FERRITIN SERPL-MCNC: 104 NG/ML (ref 24–336)
HCT VFR BLD AUTO: 48.4 % (ref 36.5–49.3)
HDLC SERPL-MCNC: 44 MG/DL
HGB BLD-MCNC: 16.5 G/DL (ref 12–17)
IMM GRANULOCYTES # BLD AUTO: 0.01 THOUSAND/UL (ref 0–0.2)
IMM GRANULOCYTES NFR BLD AUTO: 0 % (ref 0–2)
IRON SATN MFR SERPL: 24 % (ref 15–50)
IRON SERPL-MCNC: 86 UG/DL (ref 50–212)
LDLC SERPL CALC-MCNC: 142 MG/DL (ref 0–100)
LYMPHOCYTES # BLD AUTO: 1.82 THOUSANDS/ÂΜL (ref 0.6–4.47)
LYMPHOCYTES NFR BLD AUTO: 29 % (ref 14–44)
MCH RBC QN AUTO: 28.1 PG (ref 26.8–34.3)
MCHC RBC AUTO-ENTMCNC: 34.1 G/DL (ref 31.4–37.4)
MCV RBC AUTO: 82 FL (ref 82–98)
MICROALBUMIN UR-MCNC: 9.9 MG/L
MICROALBUMIN/CREAT 24H UR: 8 MG/G CREATININE (ref 0–30)
MONOCYTES # BLD AUTO: 0.44 THOUSAND/ÂΜL (ref 0.17–1.22)
MONOCYTES NFR BLD AUTO: 7 % (ref 4–12)
NEUTROPHILS # BLD AUTO: 3.91 THOUSANDS/ÂΜL (ref 1.85–7.62)
NEUTS SEG NFR BLD AUTO: 63 % (ref 43–75)
NONHDLC SERPL-MCNC: 166 MG/DL
NRBC BLD AUTO-RTO: 0 /100 WBCS
PLATELET # BLD AUTO: 241 THOUSANDS/UL (ref 149–390)
PMV BLD AUTO: 10.6 FL (ref 8.9–12.7)
RBC # BLD AUTO: 5.88 MILLION/UL (ref 3.88–5.62)
TIBC SERPL-MCNC: 357 UG/DL (ref 250–450)
TRANSFERRIN SERPL-MCNC: 255 MG/DL (ref 203–362)
TRIGL SERPL-MCNC: 118 MG/DL (ref ?–150)
TSH SERPL DL<=0.05 MIU/L-ACNC: 1.42 UIU/ML (ref 0.45–4.5)
UIBC SERPL-MCNC: 271 UG/DL (ref 155–355)
WBC # BLD AUTO: 6.26 THOUSAND/UL (ref 4.31–10.16)

## 2025-03-03 PROCEDURE — 83550 IRON BINDING TEST: CPT

## 2025-03-03 PROCEDURE — 82306 VITAMIN D 25 HYDROXY: CPT

## 2025-03-03 PROCEDURE — 82728 ASSAY OF FERRITIN: CPT

## 2025-03-03 PROCEDURE — 80061 LIPID PANEL: CPT

## 2025-03-03 PROCEDURE — 36415 COLL VENOUS BLD VENIPUNCTURE: CPT

## 2025-03-03 PROCEDURE — 82043 UR ALBUMIN QUANTITATIVE: CPT

## 2025-03-03 PROCEDURE — 83540 ASSAY OF IRON: CPT

## 2025-03-03 PROCEDURE — 82570 ASSAY OF URINE CREATININE: CPT

## 2025-03-04 ENCOUNTER — RESULTS FOLLOW-UP (OUTPATIENT)
Dept: FAMILY MEDICINE CLINIC | Facility: CLINIC | Age: 35
End: 2025-03-04

## 2025-03-11 ENCOUNTER — OFFICE VISIT (OUTPATIENT)
Dept: FAMILY MEDICINE CLINIC | Facility: CLINIC | Age: 35
End: 2025-03-11
Payer: COMMERCIAL

## 2025-03-11 VITALS
HEART RATE: 98 BPM | HEIGHT: 68 IN | DIASTOLIC BLOOD PRESSURE: 80 MMHG | SYSTOLIC BLOOD PRESSURE: 136 MMHG | BODY MASS INDEX: 30.01 KG/M2 | OXYGEN SATURATION: 97 % | WEIGHT: 198 LBS

## 2025-03-11 DIAGNOSIS — F41.0 PANIC ATTACKS: ICD-10-CM

## 2025-03-11 DIAGNOSIS — E78.00 ELEVATED LDL CHOLESTEROL LEVEL: ICD-10-CM

## 2025-03-11 DIAGNOSIS — Z28.21 PNEUMOCOCCAL VACCINATION DECLINED BY PATIENT: ICD-10-CM

## 2025-03-11 DIAGNOSIS — F17.290 VAPING NICOTINE DEPENDENCE, TOBACCO PRODUCT: ICD-10-CM

## 2025-03-11 DIAGNOSIS — E55.9 VITAMIN D DEFICIENCY: ICD-10-CM

## 2025-03-11 DIAGNOSIS — Z87.891 FORMER SMOKER: ICD-10-CM

## 2025-03-11 DIAGNOSIS — Z28.21 INFLUENZA VACCINATION DECLINED BY PATIENT: ICD-10-CM

## 2025-03-11 DIAGNOSIS — F41.9 ANXIETY: ICD-10-CM

## 2025-03-11 DIAGNOSIS — I10 ELEVATED BLOOD PRESSURE READING IN OFFICE WITH DIAGNOSIS OF HYPERTENSION: Primary | ICD-10-CM

## 2025-03-11 DIAGNOSIS — R00.2 INTERMITTENT PALPITATIONS: ICD-10-CM

## 2025-03-11 DIAGNOSIS — Z53.20 MEDICATION THERAPY MANAGEMENT RECOMMENDATION DECLINED BY PATIENT: ICD-10-CM

## 2025-03-11 PROCEDURE — 99214 OFFICE O/P EST MOD 30 MIN: CPT | Performed by: FAMILY MEDICINE

## 2025-03-11 NOTE — ASSESSMENT & PLAN NOTE
- did note worsening s/s after drinking coffee   - has gotten better after limiting caffeine intake   - former smoker  - advised vaping cessation

## 2025-03-11 NOTE — PROGRESS NOTES
"Name: Rogers Fonseca      : 1990      MRN: [id number]  Encounter Provider: Perla Treviño DO  Encounter Date: 3/11/2025   Encounter department: [facility]  :  Assessment & Plan  Elevated blood pressure reading in office with diagnosis of hypertension  - BP in the office 145/82 and on my repeat 136/80   - stable renal function and urine microalbumin   - had seen a Cardio prior - HCTZ was changed to ACEI which pt was unable to tolerate and was started on CCB - currently not on any meds   - former smoker   - (+) currently vapes   - declined annual Flu vaccine  - declined PCV20   - discussed diet - educational information re: DASH diet given to pt   - pt declined starting antihypertensive in the office today   - referred to Nephro   - caution with NSAIDs  - limit Na to 2g/day   - annual Ophtho   - RTO in 3months for f/u - pt aware and agreeable   Orders:    Ambulatory Referral to Nephrology; Future    Intermittent palpitations  - did note worsening s/s after drinking coffee   - has gotten better after limiting caffeine intake   - former smoker  - advised vaping cessation        Former smoker         Vaping nicotine dependence, tobacco product         Pneumococcal vaccination declined by patient         Influenza vaccination declined by patient         Anxiety  - not too bad for a while  - a couple weeks ago - had coffee and an espresso - \"thought I was going to die\"   - since then anxiety has been \"horrible\" - now more panic attacks   - used to take Xanax for anxiety, but then would not be able to remember things/fall asleep and self d/c'd - does not want to restart meds   - (+) FHx of Depression in Father - currently not taking any medications   - has not followed with Therapist and would rather not see one   - declined medication in the office today        Panic attacks         Medication therapy management recommendation declined by patient         Vitamin D deficiency    Orders:    " "Cholecalciferol 1.25 MG (44881 UT) TABS; Take 1 tablet (50,000 Units total) by mouth once a week X12wks and then switch to OTC Vit D 2000IU QD    Elevated LDL cholesterol level  -    - diet/exercise - advised to decrease red meat, dairy, eggs intake               History of Present Illness   HPI  - BP in the office 145/82 and on my repeat 136/80   - reviewed labs done 3/3/2025   - had seen a Cardio prior - HCTZ was changed to ACEI which pt was unable to tolerate and was started on CCB - currently not on any meds   - former smoker   - (+) currently vapes   - declined annual Flu vaccine  - declined PCV20   - (+) anxiety and panic attacks   - used to take Xanax for anxiety, but then would not be able to remember things/fall asleep and self d/c'd - does not want to restart meds   - (+) FHx of Depression in Father - currently not taking any medications   - has not followed with Therapist and would rather not see one   - declined medication in the office today       Review of Systems as per HPI     Objective   /80 (BP Location: Left arm, Patient Position: Sitting, Cuff Size: Large)   Pulse 98   Ht 5' 8\" (1.727 m)   Wt 89.8 kg (198 lb)   SpO2 97%   BMI 30.11 kg/m²      Physical Exam  Vitals reviewed.   Constitutional:       General: He is not in acute distress.     Appearance: Normal appearance. He is obese. He is not ill-appearing, toxic-appearing or diaphoretic.   HENT:      Head: Normocephalic and atraumatic.      Right Ear: External ear normal.      Left Ear: External ear normal.      Nose: Nose normal.   Eyes:      General: No scleral icterus.        Right eye: No discharge.         Left eye: No discharge.      Extraocular Movements: Extraocular movements intact.      Conjunctiva/sclera: Conjunctivae normal.   Cardiovascular:      Rate and Rhythm: Normal rate and regular rhythm.      Heart sounds: Normal heart sounds. No murmur heard.     No friction rub. No gallop.   Pulmonary:      Effort: " Pulmonary effort is normal. No respiratory distress.      Breath sounds: Normal breath sounds. No stridor. No wheezing, rhonchi or rales.   Abdominal:      Palpations: Abdomen is soft.   Musculoskeletal:         General: Normal range of motion.      Cervical back: Normal range of motion.      Right lower leg: No edema.      Left lower leg: No edema.   Skin:     General: Skin is warm.   Neurological:      General: No focal deficit present.      Mental Status: He is alert and oriented to person, place, and time.   Psychiatric:         Mood and Affect: Mood normal.         Behavior: Behavior normal.

## 2025-03-11 NOTE — ASSESSMENT & PLAN NOTE
"- not too bad for a while  - a couple weeks ago - had coffee and an espresso - \"thought I was going to die\"   - since then anxiety has been \"horrible\" - now more panic attacks   - used to take Xanax for anxiety, but then would not be able to remember things/fall asleep and self d/c'd - does not want to restart meds   - (+) FHx of Depression in Father - currently not taking any medications   - has not followed with Therapist and would rather not see one   - declined medication in the office today        "

## 2025-03-11 NOTE — ASSESSMENT & PLAN NOTE
- BP in the office 145/82 and on my repeat 136/80   - stable renal function and urine microalbumin   - had seen a Cardio prior - HCTZ was changed to ACEI which pt was unable to tolerate and was started on CCB - currently not on any meds   - former smoker   - (+) currently vapes   - declined annual Flu vaccine  - declined PCV20   - discussed diet - educational information re: DASH diet given to pt   - pt declined starting antihypertensive in the office today   - referred to Nephro   - caution with NSAIDs  - limit Na to 2g/day   - annual Ophtho   - RTO in 3months for f/u - pt aware and agreeable   Orders:    Ambulatory Referral to Nephrology; Future

## 2025-03-11 NOTE — ASSESSMENT & PLAN NOTE
Orders:    Cholecalciferol 1.25 MG (75223 UT) TABS; Take 1 tablet (50,000 Units total) by mouth once a week X12wks and then switch to OTC Vit D 2000IU QD

## 2025-04-29 ENCOUNTER — OFFICE VISIT (OUTPATIENT)
Dept: NEPHROLOGY | Facility: CLINIC | Age: 35
End: 2025-04-29
Payer: COMMERCIAL

## 2025-04-29 VITALS
SYSTOLIC BLOOD PRESSURE: 152 MMHG | HEIGHT: 68 IN | WEIGHT: 196 LBS | HEART RATE: 79 BPM | DIASTOLIC BLOOD PRESSURE: 98 MMHG | BODY MASS INDEX: 29.7 KG/M2

## 2025-04-29 DIAGNOSIS — E55.9 VITAMIN D DEFICIENCY: ICD-10-CM

## 2025-04-29 DIAGNOSIS — I10 ELEVATED BLOOD PRESSURE READING IN OFFICE WITH DIAGNOSIS OF HYPERTENSION: ICD-10-CM

## 2025-04-29 DIAGNOSIS — I10 ESSENTIAL HYPERTENSION: Primary | ICD-10-CM

## 2025-04-29 DIAGNOSIS — E66.3 OVERWEIGHT (BMI 25.0-29.9): ICD-10-CM

## 2025-04-29 PROCEDURE — 99204 OFFICE O/P NEW MOD 45 MIN: CPT | Performed by: STUDENT IN AN ORGANIZED HEALTH CARE EDUCATION/TRAINING PROGRAM

## 2025-04-29 NOTE — ASSESSMENT & PLAN NOTE
volume: Euvolemic  Blood pressure: Hypertensive, /90, home BP above goal  Recommend:  Low-sodium diet recommend goal less than 138/85 due to age  Recommend start nifedipine 30 mg however patient does not want to start medication  Risk of cardiovascular disease and kidney disease explained in detail  Plan to check renin, aldosterone, cortisol, VAS renal  24-hour blood pressure monitor recommended  Follow-up in 4 months

## 2025-04-29 NOTE — PATIENT INSTRUCTIONS
Thank you for coming to your visit today. As we discussed you kidney function is normal. Please follow the recommendations below       Recommend low sodium (salt) food    Avoid nonsteroidal anti-inflammatory drugs such as Naprosyn, ibuprofen, Aleve, Advil, Celebrex, Meloxicam (Mobic) etc.  You can use Tylenol as needed if you do not have any liver condition to be concerned about  Blood Pressure Measurement Instructions:  Take the morning readings before any medications and when yo are relaxed for several minutes   Take the evening readings between 7pm and 10pm at least 30 minutes before or after dinner/eating/coffee or alcohol intake and certainly before any blood pressure medications,   Please include heart rate with your blood pressure readings   When taking standing readings, keep your arm supported at heart level and not dangling  Make sure you are sitting with your back supported and feet on the ground and do not cross your legs or feet  Make sure you have not taken any coffee or caffeine products or exercised or smoke cigarettes at least 30 minutes before taking your blood pressure    Blood work and 24 hr blood pressure monitor   Referral to sleep medicine     Next Visit in 4 months with results   If you need to see us earlier we can change the appointment for you      Joselyn Reyes Bahamonde, MD  Nephrology Attending

## 2025-04-29 NOTE — PROGRESS NOTES
Name: Rogers Fonseca      : 1990      MRN: [id number]  Encounter Provider: Joselyn Reyes Bahamonde, MD  Encounter Date: 2025   Encounter department: [facility]  :  Assessment & Plan  Essential hypertension  volume: Euvolemic  Blood pressure: Hypertensive, /90, home BP above goal  Recommend:  Low-sodium diet recommend goal less than 138/85 due to age  Recommend start nifedipine 30 mg however patient does not want to start medication  Risk of cardiovascular disease and kidney disease explained in detail  Plan to check renin, aldosterone, cortisol, VAS renal  24-hour blood pressure monitor recommended  Follow-up in 4 months         Vitamin D deficiency  Patient is taking vitamin D supplements       Elevated blood pressure reading in office with diagnosis of hypertension    Orders:    Ambulatory Referral to Nephrology    Overweight (BMI 25.0-29.9)  BMI 29.80  Recommend weight loss , heathy diet  Lifestyle modification               History of Present Illness   HPI  Rogers Fonseca is a 34 y.o. male overweight, hypertension since  who presents initial evaluation of hypertension  History obtained from: patient    Review of Systems   Constitutional:  Negative for activity change and appetite change.   HENT:  Negative for congestion and dental problem.    Eyes:  Negative for discharge.   Respiratory:  Negative for shortness of breath.    Cardiovascular:  Negative for chest pain and leg swelling.   Gastrointestinal:  Negative for abdominal distention and abdominal pain.   Endocrine: Negative for cold intolerance.   Genitourinary:  Negative for dysuria.   Musculoskeletal:  Negative for arthralgias.   Skin:  Negative for color change and pallor.   Neurological:  Negative for dizziness.   Psychiatric/Behavioral:  Negative for agitation.      Pertinent Medical History     Medical History Reviewed by provider this encounter:     .  Past Medical History   Past Medical History:    Diagnosis Date    Anxiety     HTN (hypertension)     Insomnia      Past Surgical History:   Procedure Laterality Date    FRACTURE SURGERY      left elbow     Family History   Problem Relation Age of Onset    No Known Problems Mother     Depression Father     No Known Problems Maternal Grandmother     Coronary artery disease Maternal Grandfather       reports that he quit smoking about 7 years ago. His smoking use included cigarettes. He started smoking about 22 years ago. He has a 22.5 pack-year smoking history. He uses smokeless tobacco. He reports current alcohol use. He reports current drug use. Drug: Marijuana.  Current Outpatient Medications   Medication Instructions    Cholecalciferol 50,000 Units, Oral, Weekly, X12wks and then switch to OTC Vit D 2000IU QD    naproxen (NAPROSYN) 500 mg, Oral, 2 times daily with meals   No Known Allergies   Current Outpatient Medications on File Prior to Visit   Medication Sig Dispense Refill    Cholecalciferol 1.25 MG (26642 UT) TABS Take 1 tablet (50,000 Units total) by mouth once a week X12wks and then switch to OTC Vit D 2000IU QD 12 tablet 0    naproxen (NAPROSYN) 500 mg tablet Take 1 tablet (500 mg total) by mouth 2 (two) times a day with meals (Patient not taking: Reported on 3/11/2025) 28 tablet 0     Current Facility-Administered Medications on File Prior to Visit   Medication Dose Route Frequency Provider Last Rate Last Admin    lidocaine (PF) (XYLOCAINE-MPF) 1 % injection 0.9 mL  0.9 mL Infiltration Once BABATUNDE Crawford          Social History     Tobacco Use    Smoking status: Former     Current packs/day: 0.00     Average packs/day: 1.5 packs/day for 15.0 years (22.5 ttl pk-yrs)     Types: Cigarettes     Start date: 2003     Quit date: 2018     Years since quittin.0    Smokeless tobacco: Current    Tobacco comments:     vaping 3mg nicotine QD    Vaping Use    Vaping status: Former    Quit date: 3/27/2021    Substances: Nicotine, THC, Flavoring    Substance and Sexual Activity    Alcohol use: Yes    Drug use: Yes     Types: Marijuana    Sexual activity: Not on file        Objective   There were no vitals taken for this visit.     Physical Exam  General:  no acute distress at this time  Skin:  No acute rash  Eyes:  No scleral icterus and noninjected  ENT:  mucous membranes moist  Neck:  no carotid bruits  Chest:  Clear to auscultation percussion, good respiratory effort, no use of accessory respiratory muscles  CVS:  Regular rate and rhythm without rub   Abdomen:  soft and nontender   Extremities: no significant lower extremity edema  Neuro:  No gross focality  Psych:  Alert , cooperative

## 2025-05-08 ENCOUNTER — HOSPITAL ENCOUNTER (OUTPATIENT)
Dept: VASCULAR ULTRASOUND | Facility: HOSPITAL | Age: 35
Discharge: HOME | End: 2025-05-08
Attending: STUDENT IN AN ORGANIZED HEALTH CARE EDUCATION/TRAINING PROGRAM
Payer: COMMERCIAL

## 2025-05-08 DIAGNOSIS — I10 ESSENTIAL HYPERTENSION: ICD-10-CM

## 2025-05-08 PROCEDURE — 93975 VASCULAR STUDY: CPT

## 2025-05-08 PROCEDURE — 93975 VASCULAR STUDY: CPT | Performed by: SURGERY

## 2025-05-19 ENCOUNTER — OFFICE VISIT (OUTPATIENT)
Dept: SLEEP CENTER | Facility: CLINIC | Age: 35
End: 2025-05-19
Attending: STUDENT IN AN ORGANIZED HEALTH CARE EDUCATION/TRAINING PROGRAM
Payer: COMMERCIAL

## 2025-05-19 VITALS
HEIGHT: 68 IN | SYSTOLIC BLOOD PRESSURE: 160 MMHG | WEIGHT: 199.6 LBS | BODY MASS INDEX: 30.25 KG/M2 | DIASTOLIC BLOOD PRESSURE: 99 MMHG | HEART RATE: 109 BPM | OXYGEN SATURATION: 93 %

## 2025-05-19 DIAGNOSIS — G47.19 EXCESSIVE DAYTIME SLEEPINESS: Primary | ICD-10-CM

## 2025-05-19 DIAGNOSIS — R06.83 SNORING: ICD-10-CM

## 2025-05-19 DIAGNOSIS — G47.00 INSOMNIA, UNSPECIFIED TYPE: ICD-10-CM

## 2025-05-19 PROCEDURE — 99243 OFF/OP CNSLTJ NEW/EST LOW 30: CPT | Performed by: STUDENT IN AN ORGANIZED HEALTH CARE EDUCATION/TRAINING PROGRAM

## 2025-05-19 NOTE — PATIENT INSTRUCTIONS
ROBI Evaluation:    I suspect you may have sleep apnea.  Sleep apnea is a serious sleep disorder that occurs when a person's breathing is interrupted during sleep. People with untreated sleep apnea stop breathing repeatedly during their sleep, sometimes hundreds of times during the night.  The most common type of sleep apnea is obstructive sleep apnea.  If left untreated, obstructive sleep apnea can result in a number of health problems including hypertension, stroke, arrhythmias, cardiomyopathy (enlargement of the muscle tissue of the heart), heart failure, diabetes, obesity, and heart attacks. It has also been found to make chronic medical conditions (such as high blood pressure, migraine headaches, and seizures (more difficult to manage.  Treatment options for obstructive sleep apnea may include positive airway pressure (PAP) therapy, oral appliance, hypoglossal nerve stimulator, nose/throat surgery, weight loss, side sleeping, or avoidance of medications or substances that can relax the airway muscles (alcohol, benzodiazepines, and opioids).  I have ordered a Home Sleep Apnea Test (HSAT) to evaluate for sleep apnea. If this is negative, I will order an in-lab polysomnogram (PSG) as discussed to be sure that sleep apnea is not present.  This test should be scheduled at your earliest convenience.   Sleep Clinic: This should be scheduled at the  before you leave today.    Avoid driving if drowsy. We recommend that if you are dozing off while driving, that you do not drive until your sleepiness is appropriately treated.  We encourage a healthy lifestyle with adequate sleep (7-9 hours per night), diet and exercise.  Recommend good sleep hygiene, as outlined below  Emphasis on avoiding naps after 2:00pm.  Recommend avoidance of nicotine-containing substances before bed, as nicotine is primarily stimulating and can affect sleep quality and the ability to fall asleep.     Myself and/or my team will reach out to  you via [employer] and/or phone call with the results and next steps.      Good Sleep Hygiene  Wake up at the same time every day, even on the weekends.  Use your bed for sleep and intimacy only.  If you have been in bed awake for 30 minutes, get up and leave the bedroom. Choose a dull activity not involving a blue screen (TV, computer, handheld devices). Go back to bed when you feel sleepy.  Avoid caffeine, nicotine and alcohol before you go to bed.  Avoid large meals before you go to bed.  Avoid using screens (computers, tablets, smartphones, etc.) for at least 1 hour before bedtime  Exercise regularly, but do not exercise right before you go to bed.  Avoid daytime naps. If you do take a nap, sleep for 20-40 minutes, and not after 2pm.

## 2025-05-19 NOTE — PROGRESS NOTES
Name: Rogers Fonseca      : 1990      MRN: [id number]  Encounter Provider: Ej Cooper DO  Encounter Date: 2025   Encounter department: [facility] SLEEP [facility]  :  CONSULTING PROVIDER:  Joselyn Reyes Bahamonde, MD  [address]  [address]  [place] [address]    Assessment & Plan  Excessive daytime sleepiness  Rogers is a pleasant 34-year-old gentleman with PMHx of HTN, tobacco abuse, vitamin D deficiency, obesity who presents for witnessed apneic events.  He does also sound to have excessive daytime sleepiness and other symptoms concerning for sleep disordered breathing, thus meriting evaluation and management if present.    Discussed with patient the pathophysiology of OSAS and medical conditions associated with OSAS such as DM, HTN, CAD, Depression, Stroke, Headache.  Treatment options including surgery, Dental appliances, positional therapy, and CPAP were discussed.  I have ordered a Home Sleep Apnea Test (HSAT) to evaluate for sleep-disordered breathing. Should this be negative/inconclusive, due to the known limitations of HSAT, I will order an in lab polysomnogram (PSG) to ensure that sleep apnea is not present.  Advised patient to avoid activities that could harm self or others when tired/sleepy, including driving.  Discussed importance of good sleep hygiene.  Pending the results of the sleep study, I will plan to order CPAP with a subsequent compliance follow-up.    Orders:    Home Sleep Study; Future    Insomnia, unspecified type  He does also have a significant insomnia, chronic in nature; there is sounds to be a possible psychophysiologic component to this, however he has other significant factors contributing to this, which would need to be addressed before pursuing something such as CBT-I.    Encouraged proper sleep hygiene, particularly avoiding daytime naps  Reviewed the effect of nicotine on sleep, and encouraged him to at least not vape close to bedtime, however  "that it would be preferable for him to quit in general.  May consider a referral for CBT-IN the future    Orders:    Home Sleep Study; Future    Snoring  See excessive daytime sleepiness  Orders:    Home Sleep Study; Future        Follow-up:  He will Return for Follow-up pending results of sleep study..    Thank you for allowing me to participate in the care of your patient.  If there are any questions regarding evaluation please feel free to reach out.       ________________________________________________________________________________________________    Subjective:     HPI: Rogers Fonseca is a 34 y.o. male with PMHx of HTN, tobacco abuse, vitamin D deficiency, obesity who presents for witnessed apneic events.    He states that he is here due to his BP and his girlfriend has told him he stops breathing while he sleeps.         SLEEP-WAKE SCHEDULE  Sleep Schedule:  Weekdays:  Bedtime: 2200   Asleep in 46-60 minutes; during that time he is \"laying there.\" Been an issue for \"forever.\"    -If he goes to sleep ~2000/2100 (\"when I'm tired\") he can fall asleep faster.   Nighttime awakenings: 2-3; unknown     Wake: 0400    Naps: After work, for ~1.5 hours. Around 1530    Average total sleep time (in a 24 hour period): ~6 hours.    Weekends:  Bedtime: 2300/0000   Asleep in 46-60 minutes   Nighttime awakenings: Same     Wake: 0700    Naps: \"Not really\"    Average total sleep time (in a 24 hour period): ~6 hours.    Sleep-Related Details:  Preferred Sleep Position: side(s)  SDB Symptoms: snoring, witnessed apneas, gasping/choking, multiple awakenings, morning headaches, dry mouth/nose, and waking unrefreshed  Sleep-Related Hallucinations: No   Sleep Paralysis: No     Parasomnias:  He has sleepwalked in the past; last instance was ~3 years ago.     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        PAST TREATMENTS:  -none    PRIOR SLEEP STUDIES:  -None    OTHER RELEVANT LABS AND STUDIES:  -None      Sitting and reading: (Patient-Rptd) High chance of dozing  Watching TV: (Patient-Rptd) High chance of dozing  Sitting, inactive in a public place (e.g. a theatre or a meeting): (Patient-Rptd) Slight chance of dozing  As a passenger in a car for an hour without a break: (Patient-Rptd) Slight chance of dozing  Lying down to rest in the afternoon when circumstances permit: (Patient-Rptd) High chance of dozing  Sitting and talking to someone: (Patient-Rptd) Would never doze  Sitting quietly after a lunch without alcohol: (Patient-Rptd) Slight chance of dozing  In a car, while stopped for a few minutes in traffic: (Patient-Rptd) Would never doze  Total score: (Patient-Rptd) 12     Review of Systems  Pertinent positives/negatives included in HPI and also as noted:     Medical History Reviewed by provider this encounter:  Tobacco  Soc Hx    .  Objective   /99 (BP Location: Left arm, Patient Position: Sitting, Cuff Size: Large)   Pulse (!) 109   Ht 5' 8\" (1.727 m)   Wt 90.5 kg (199 lb 9.6 oz)   SpO2 93%   BMI 30.35 kg/m²     Neck Circumference: 16.5  Physical Exam  Visit Vitals  /99 (BP Location: Left arm, Patient Position: Sitting, Cuff Size: Large)   Pulse (!) 109   Ht 5' 8\" (1.727 m)   Wt 90.5 kg (199 lb 9.6 oz)   SpO2 93%   BMI 30.35 kg/m²   Smoking Status Former   BSA 2.04 m²     PHYSICAL EXAMINATION:  Vital Signs:  /99 (BP Location: Left arm, Patient Position: Sitting, Cuff Size: Large)   Pulse (!) 109   Ht 5' 8\" (1.727 m)   Wt 90.5 kg (199 lb 9.6 oz)   SpO2 93%   BMI 30.35 kg/m²  Body mass index is 30.35 kg/m².    Constitutional: NAD, well appearing   Mental Status: AAOx3  Neck Circumference: Neck Circumference: 16.5 inches  Skin: Warm, dry, no rashes noted   Eyes: PERRL, normal " conjunctiva  ENT: Nasal congestion present  Posterior Airspace:   Dawkins Tongue Position: 4  Retrognathia: absent  Overbite: present  High Arched Palate: absent  Tongue Scalloping/Ridging: present  Uvula: enlarged  Chest: No evidence of respiratory distress, no accessory muscle use; no evidence of peripheral cyanosis  Abdomen: Soft, NT/ND  Extremities: No digital clubbing or pedal edema    NEUROLOGICAL EXAM:  General: Awake, alert, speech fluent, comprehension, naming, repetition intact. Short and long term memory intact.  CN: PERRL, EOMI without nystagmus, facial sensation and strength are normal and symmetric, hearing is intact to conversational tone, palate and tongue movements are intact and symmetric.  Motor: Normal tone, bulk and strength bilaterally.   Sensation: LT intact throughout.  Gait: Able to walk without difficulty. Stance normal.      Data  Lab Results   Component Value Date    HGB 16.5 03/03/2025    HCT 48.4 03/03/2025    MCV 82 03/03/2025      Lab Results   Component Value Date    GLUCOSE 98 01/01/2016    CALCIUM 9.1 02/06/2025     (L) 01/01/2016    K 3.6 02/06/2025    CO2 26 02/06/2025     02/06/2025    BUN 18 02/06/2025    CREATININE 0.90 02/06/2025     Lab Results   Component Value Date    IRON 86 03/03/2025    TIBC 357 03/03/2025    FERRITIN 104 03/03/2025     Lab Results   Component Value Date    AST 16 02/06/2025    ALT 24 02/06/2025       Administrative Statements   I have spent a total time of 30-35 minutes in caring for this patient on the day of the visit/encounter including Diagnostic results, Prognosis, Risks and benefits of tx options, Instructions for management, Patient and family education, Importance of tx compliance, Risk factor reductions, Documenting in the medical record, Reviewing/placing orders in the medical record (including tests, medications, and/or procedures), and Obtaining or reviewing history  .    Electronically signed by:    Ej Cooper  DO  Board-Certified Neurology and Sleep Medicine  [facility]  05/19/25

## 2025-05-19 NOTE — ASSESSMENT & PLAN NOTE
He does also have a significant insomnia, chronic in nature; there is sounds to be a possible psychophysiologic component to this, however he has other significant factors contributing to this, which would need to be addressed before pursuing something such as CBT-I.    Encouraged proper sleep hygiene, particularly avoiding daytime naps  Reviewed the effect of nicotine on sleep, and encouraged him to at least not vape close to bedtime, however that it would be preferable for him to quit in general.  May consider a referral for CBT-IN the future    Orders:    Home Sleep Study; Future

## 2025-06-03 DIAGNOSIS — E55.9 VITAMIN D DEFICIENCY: ICD-10-CM

## 2025-06-04 RX ORDER — METHOCARBAMOL 750 MG/1
TABLET ORAL
Qty: 12 CAPSULE | OUTPATIENT
Start: 2025-06-04

## 2025-06-06 ENCOUNTER — RESULTS FOLLOW-UP (OUTPATIENT)
Dept: OTHER | Facility: HOSPITAL | Age: 35
End: 2025-06-06

## 2025-06-10 ENCOUNTER — TELEPHONE (OUTPATIENT)
Dept: NEPHROLOGY | Facility: CLINIC | Age: 35
End: 2025-06-10

## 2025-06-24 ENCOUNTER — HOSPITAL ENCOUNTER (OUTPATIENT)
Dept: SLEEP CENTER | Facility: CLINIC | Age: 35
Discharge: HOME | End: 2025-06-24
Attending: STUDENT IN AN ORGANIZED HEALTH CARE EDUCATION/TRAINING PROGRAM

## 2025-06-24 DIAGNOSIS — R06.83 SNORING: ICD-10-CM

## 2025-06-24 DIAGNOSIS — G47.00 INSOMNIA, UNSPECIFIED TYPE: ICD-10-CM

## 2025-06-24 DIAGNOSIS — G47.19 EXCESSIVE DAYTIME SLEEPINESS: ICD-10-CM

## 2025-06-25 NOTE — PROGRESS NOTES
Home Sleep Study Documentation    HOME STUDY DEVICE: Noxturnal no                                           Monik G3 yes      Pre-Sleep Home Study:    Set-up and instructions performed by: Tony Johansen    Technician performed demonstration for Patient: yes    Return demonstration performed by Patient: yes    Written instructions provided to Patient: yes    Patient signed consent form: yes          Post-Sleep Home Study:    Post Test Questionnaire Data: Pending    Additional comments by Patient: pending    Home Sleep Study Failed:pending    Failure reason: pending    Reported or Detected: pending    Scored by: pending

## 2025-07-01 ENCOUNTER — TELEPHONE (OUTPATIENT)
Facility: HOSPITAL | Age: 35
End: 2025-07-01

## 2025-07-01 NOTE — TELEPHONE ENCOUNTER
Left message regarding failed home sleep test, asking patient to call and reschedule at [phone].

## 2025-07-09 ENCOUNTER — HOSPITAL ENCOUNTER (OUTPATIENT)
Dept: SLEEP CENTER | Facility: CLINIC | Age: 35
Discharge: HOME | End: 2025-07-09
Attending: STUDENT IN AN ORGANIZED HEALTH CARE EDUCATION/TRAINING PROGRAM
Payer: COMMERCIAL

## 2025-07-09 PROCEDURE — G0399 HOME SLEEP TEST/TYPE 3 PORTA: HCPCS

## 2025-07-09 NOTE — PROGRESS NOTES
Home Sleep Study Documentation    HOME STUDY DEVICE: Noxturnal no                                           Monik G3 yes device # 8      Pre-Sleep Home Study:    Set-up and instructions performed by: Karolyn    Technician performed demonstration for Patient: yes    Return demonstration performed by Patient: yes    Written instructions provided to Patient: yes    Patient signed consent form: yes          Post-Sleep Home Study:    Post Test Questionnaire Data: Pending    Additional comments by Patient: pending    Home Sleep Study Failed:pending    Failure reason: pending    Reported or Detected: pending    Scored by: pending

## 2025-07-18 ENCOUNTER — TELEPHONE (OUTPATIENT)
Dept: SLEEP CENTER | Facility: CLINIC | Age: 35
End: 2025-07-18

## 2025-07-18 DIAGNOSIS — G47.00 INSOMNIA, UNSPECIFIED TYPE: ICD-10-CM

## 2025-07-18 DIAGNOSIS — G47.19 EXCESSIVE DAYTIME SLEEPINESS: ICD-10-CM

## 2025-07-18 DIAGNOSIS — R06.83 SNORING: ICD-10-CM

## 2025-07-18 DIAGNOSIS — G47.33 OSA (OBSTRUCTIVE SLEEP APNEA): Primary | ICD-10-CM

## 2025-07-18 PROCEDURE — 95806 SLEEP STUDY UNATT&RESP EFFT: CPT | Performed by: STUDENT IN AN ORGANIZED HEALTH CARE EDUCATION/TRAINING PROGRAM

## 2025-07-18 NOTE — TELEPHONE ENCOUNTER
Karen from [facility] [place] called in stating received fax for records and CPAP supplies however they do not carry these supplies

## 2025-07-22 NOTE — TELEPHONE ENCOUNTER
Call placed to patient.  Advised [facility] called in stating they do not carry these supplies.    Patient will use as [employer] as DME supplier, chart updated.    Added to the [employer] list for order to be processed.

## 2025-07-23 LAB
DME PARACHUTE DELIVERY DATE REQUESTED: NORMAL
DME PARACHUTE ITEM DESCRIPTION: NORMAL
DME PARACHUTE ORDER STATUS: NORMAL
DME PARACHUTE SUPPLIER NAME: NORMAL
DME PARACHUTE SUPPLIER PHONE: NORMAL

## 2025-07-29 ENCOUNTER — TELEPHONE (OUTPATIENT)
Dept: SLEEP CENTER | Facility: CLINIC | Age: 35
End: 2025-07-29

## 2025-07-29 LAB
